# Patient Record
Sex: FEMALE | Race: WHITE | NOT HISPANIC OR LATINO
[De-identification: names, ages, dates, MRNs, and addresses within clinical notes are randomized per-mention and may not be internally consistent; named-entity substitution may affect disease eponyms.]

---

## 2017-01-24 ENCOUNTER — APPOINTMENT (OUTPATIENT)
Dept: GASTROENTEROLOGY | Facility: CLINIC | Age: 82
End: 2017-01-24

## 2017-02-13 LAB
INR PPP: 2.31
INR PPP: 2.4

## 2017-02-27 LAB — INR PPP: 2.12

## 2017-03-14 LAB — INR PPP: 2.64

## 2017-04-03 LAB — INR PPP: 2.52

## 2017-04-10 LAB — INR PPP: 2.92

## 2017-04-25 ENCOUNTER — APPOINTMENT (OUTPATIENT)
Dept: CARDIOLOGY | Facility: CLINIC | Age: 82
End: 2017-04-25

## 2017-04-25 ENCOUNTER — NON-APPOINTMENT (OUTPATIENT)
Age: 82
End: 2017-04-25

## 2017-04-25 VITALS
WEIGHT: 130 LBS | HEIGHT: 64 IN | DIASTOLIC BLOOD PRESSURE: 73 MMHG | BODY MASS INDEX: 22.2 KG/M2 | OXYGEN SATURATION: 95 % | HEART RATE: 81 BPM | SYSTOLIC BLOOD PRESSURE: 154 MMHG

## 2017-04-25 DIAGNOSIS — J45.909 UNSPECIFIED ASTHMA, UNCOMPLICATED: ICD-10-CM

## 2017-05-08 LAB — INR PPP: 1.93

## 2017-05-26 ENCOUNTER — APPOINTMENT (OUTPATIENT)
Dept: GASTROENTEROLOGY | Facility: CLINIC | Age: 82
End: 2017-05-26

## 2017-05-26 VITALS
WEIGHT: 139 LBS | OXYGEN SATURATION: 96 % | SYSTOLIC BLOOD PRESSURE: 140 MMHG | BODY MASS INDEX: 23.86 KG/M2 | DIASTOLIC BLOOD PRESSURE: 80 MMHG | RESPIRATION RATE: 14 BRPM | HEART RATE: 68 BPM

## 2017-05-26 DIAGNOSIS — Z87.19 PERSONAL HISTORY OF OTHER DISEASES OF THE DIGESTIVE SYSTEM: ICD-10-CM

## 2017-05-26 DIAGNOSIS — K92.1 MELENA: ICD-10-CM

## 2017-05-26 DIAGNOSIS — Z86.010 PERSONAL HISTORY OF COLONIC POLYPS: ICD-10-CM

## 2017-05-26 DIAGNOSIS — K57.30 DIVERTICULOSIS OF LARGE INTESTINE W/OUT PERFORATION OR ABSCESS W/OUT BLEEDING: ICD-10-CM

## 2017-05-26 LAB — INR PPP: 2.34

## 2017-06-27 LAB
INR PPP: 2.02
INR PPP: 2.04

## 2017-07-25 ENCOUNTER — APPOINTMENT (OUTPATIENT)
Dept: CARDIOLOGY | Facility: CLINIC | Age: 82
End: 2017-07-25

## 2017-07-25 ENCOUNTER — APPOINTMENT (OUTPATIENT)
Dept: CV DIAGNOSITCS | Facility: HOSPITAL | Age: 82
End: 2017-07-25

## 2017-08-15 ENCOUNTER — APPOINTMENT (OUTPATIENT)
Dept: CARDIOLOGY | Facility: CLINIC | Age: 82
End: 2017-08-15

## 2017-08-21 ENCOUNTER — APPOINTMENT (OUTPATIENT)
Dept: CV DIAGNOSITCS | Facility: HOSPITAL | Age: 82
End: 2017-08-21

## 2017-08-21 ENCOUNTER — NON-APPOINTMENT (OUTPATIENT)
Age: 82
End: 2017-08-21

## 2017-08-21 ENCOUNTER — APPOINTMENT (OUTPATIENT)
Dept: CARDIOLOGY | Facility: CLINIC | Age: 82
End: 2017-08-21
Payer: MEDICARE

## 2017-08-21 ENCOUNTER — OUTPATIENT (OUTPATIENT)
Dept: OUTPATIENT SERVICES | Facility: HOSPITAL | Age: 82
LOS: 1 days | End: 2017-08-21
Payer: MEDICARE

## 2017-08-21 VITALS
SYSTOLIC BLOOD PRESSURE: 167 MMHG | BODY MASS INDEX: 23.56 KG/M2 | HEART RATE: 96 BPM | DIASTOLIC BLOOD PRESSURE: 65 MMHG | WEIGHT: 138 LBS | RESPIRATION RATE: 14 BRPM | HEIGHT: 64 IN | OXYGEN SATURATION: 97 %

## 2017-08-21 DIAGNOSIS — I48.91 UNSPECIFIED ATRIAL FIBRILLATION: ICD-10-CM

## 2017-08-21 DIAGNOSIS — I34.0 NONRHEUMATIC MITRAL (VALVE) INSUFFICIENCY: ICD-10-CM

## 2017-08-21 PROCEDURE — 93000 ELECTROCARDIOGRAM COMPLETE: CPT

## 2017-08-21 PROCEDURE — 93306 TTE W/DOPPLER COMPLETE: CPT | Mod: 26

## 2017-08-21 PROCEDURE — 99215 OFFICE O/P EST HI 40 MIN: CPT

## 2017-08-22 LAB
ALBUMIN SERPL ELPH-MCNC: 4.1 G/DL
ALP BLD-CCNC: 65 U/L
ALT SERPL-CCNC: 36 U/L
ANION GAP SERPL CALC-SCNC: 16 MMOL/L
AST SERPL-CCNC: 33 U/L
BASOPHILS # BLD AUTO: 0.01 K/UL
BASOPHILS NFR BLD AUTO: 0.1 %
BILIRUB SERPL-MCNC: 1.8 MG/DL
BUN SERPL-MCNC: 19 MG/DL
CALCIUM SERPL-MCNC: 10.4 MG/DL
CHLORIDE SERPL-SCNC: 98 MMOL/L
CHOLEST SERPL-MCNC: 171 MG/DL
CHOLEST/HDLC SERPL: 2 RATIO
CO2 SERPL-SCNC: 25 MMOL/L
CREAT SERPL-MCNC: 0.71 MG/DL
EOSINOPHIL # BLD AUTO: 0.11 K/UL
EOSINOPHIL NFR BLD AUTO: 1.5 %
GLUCOSE SERPL-MCNC: 71 MG/DL
HCT VFR BLD CALC: 37.2 %
HDLC SERPL-MCNC: 84 MG/DL
HGB BLD-MCNC: 12.2 G/DL
IMM GRANULOCYTES NFR BLD AUTO: 0.3 %
LDLC SERPL CALC-MCNC: 70 MG/DL
LYMPHOCYTES # BLD AUTO: 1.71 K/UL
LYMPHOCYTES NFR BLD AUTO: 24 %
MAN DIFF?: NORMAL
MCHC RBC-ENTMCNC: 32 PG
MCHC RBC-ENTMCNC: 32.8 GM/DL
MCV RBC AUTO: 97.6 FL
MONOCYTES # BLD AUTO: 0.66 K/UL
MONOCYTES NFR BLD AUTO: 9.3 %
NEUTROPHILS # BLD AUTO: 4.62 K/UL
NEUTROPHILS NFR BLD AUTO: 64.8 %
PLATELET # BLD AUTO: 191 K/UL
POTASSIUM SERPL-SCNC: 4.4 MMOL/L
PROT SERPL-MCNC: 7.5 G/DL
RBC # BLD: 3.81 M/UL
RBC # FLD: 17.5 %
SODIUM SERPL-SCNC: 139 MMOL/L
TRIGL SERPL-MCNC: 87 MG/DL
TSH SERPL-ACNC: 1.32 UIU/ML
WBC # FLD AUTO: 7.13 K/UL

## 2017-08-22 RX ORDER — METOPROLOL SUCCINATE 50 MG/1
50 TABLET, EXTENDED RELEASE ORAL DAILY
Qty: 90 | Refills: 3 | Status: DISCONTINUED | COMMUNITY
Start: 2017-08-22 | End: 2017-08-22

## 2017-10-31 LAB — INR PPP: 1.98

## 2017-12-19 ENCOUNTER — NON-APPOINTMENT (OUTPATIENT)
Age: 82
End: 2017-12-19

## 2017-12-19 ENCOUNTER — APPOINTMENT (OUTPATIENT)
Dept: CARDIOLOGY | Facility: CLINIC | Age: 82
End: 2017-12-19
Payer: MEDICARE

## 2017-12-19 VITALS
SYSTOLIC BLOOD PRESSURE: 155 MMHG | DIASTOLIC BLOOD PRESSURE: 75 MMHG | HEART RATE: 77 BPM | OXYGEN SATURATION: 95 % | BODY MASS INDEX: 24.41 KG/M2 | HEIGHT: 64 IN | RESPIRATION RATE: 14 BRPM | WEIGHT: 143 LBS

## 2017-12-19 VITALS
DIASTOLIC BLOOD PRESSURE: 62 MMHG | HEART RATE: 86 BPM | OXYGEN SATURATION: 95 % | SYSTOLIC BLOOD PRESSURE: 148 MMHG | BODY MASS INDEX: 24.55 KG/M2 | WEIGHT: 143 LBS

## 2017-12-19 PROCEDURE — 93000 ELECTROCARDIOGRAM COMPLETE: CPT

## 2017-12-19 PROCEDURE — 99215 OFFICE O/P EST HI 40 MIN: CPT

## 2017-12-19 RX ORDER — FLUTICASONE FUROATE AND VILANTEROL TRIFENATATE 100; 25 UG/1; UG/1
100-25 POWDER RESPIRATORY (INHALATION)
Refills: 0 | Status: DISCONTINUED | COMMUNITY
Start: 2017-04-25 | End: 2017-12-19

## 2018-04-17 ENCOUNTER — APPOINTMENT (OUTPATIENT)
Dept: CARDIOLOGY | Facility: CLINIC | Age: 83
End: 2018-04-17
Payer: MEDICARE

## 2018-04-17 ENCOUNTER — NON-APPOINTMENT (OUTPATIENT)
Age: 83
End: 2018-04-17

## 2018-04-17 VITALS
OXYGEN SATURATION: 95 % | WEIGHT: 135 LBS | SYSTOLIC BLOOD PRESSURE: 153 MMHG | RESPIRATION RATE: 14 BRPM | HEIGHT: 64 IN | HEART RATE: 74 BPM | DIASTOLIC BLOOD PRESSURE: 80 MMHG | BODY MASS INDEX: 23.05 KG/M2

## 2018-04-17 VITALS — SYSTOLIC BLOOD PRESSURE: 132 MMHG | DIASTOLIC BLOOD PRESSURE: 65 MMHG

## 2018-04-17 PROCEDURE — 99215 OFFICE O/P EST HI 40 MIN: CPT

## 2018-04-17 PROCEDURE — 93000 ELECTROCARDIOGRAM COMPLETE: CPT

## 2018-07-28 PROBLEM — K57.30 DIVERTICULOSIS OF COLON: Status: RESOLVED | Noted: 2017-05-26 | Resolved: 2018-07-28

## 2018-07-31 ENCOUNTER — APPOINTMENT (OUTPATIENT)
Dept: CARDIOLOGY | Facility: CLINIC | Age: 83
End: 2018-07-31
Payer: MEDICARE

## 2018-07-31 VITALS
DIASTOLIC BLOOD PRESSURE: 70 MMHG | SYSTOLIC BLOOD PRESSURE: 169 MMHG | HEART RATE: 106 BPM | WEIGHT: 135 LBS | BODY MASS INDEX: 23.05 KG/M2 | RESPIRATION RATE: 14 BRPM | HEIGHT: 64 IN | OXYGEN SATURATION: 96 %

## 2018-07-31 VITALS — SYSTOLIC BLOOD PRESSURE: 135 MMHG | DIASTOLIC BLOOD PRESSURE: 60 MMHG

## 2018-07-31 PROCEDURE — 99215 OFFICE O/P EST HI 40 MIN: CPT

## 2018-07-31 PROCEDURE — 93000 ELECTROCARDIOGRAM COMPLETE: CPT

## 2018-12-11 ENCOUNTER — NON-APPOINTMENT (OUTPATIENT)
Age: 83
End: 2018-12-11

## 2018-12-11 ENCOUNTER — APPOINTMENT (OUTPATIENT)
Dept: CARDIOLOGY | Facility: CLINIC | Age: 83
End: 2018-12-11
Payer: MEDICARE

## 2018-12-11 VITALS
WEIGHT: 136 LBS | BODY MASS INDEX: 23.22 KG/M2 | SYSTOLIC BLOOD PRESSURE: 153 MMHG | HEIGHT: 64 IN | RESPIRATION RATE: 16 BRPM | TEMPERATURE: 98 F | HEART RATE: 80 BPM | DIASTOLIC BLOOD PRESSURE: 80 MMHG | OXYGEN SATURATION: 97 %

## 2018-12-11 DIAGNOSIS — L40.9 PSORIASIS, UNSPECIFIED: ICD-10-CM

## 2018-12-11 PROCEDURE — 99215 OFFICE O/P EST HI 40 MIN: CPT

## 2018-12-11 PROCEDURE — 93000 ELECTROCARDIOGRAM COMPLETE: CPT

## 2018-12-11 RX ORDER — VALSARTAN 40 MG/1
40 TABLET, COATED ORAL DAILY
Refills: 0 | Status: DISCONTINUED | COMMUNITY
Start: 2017-04-25 | End: 2018-12-11

## 2018-12-11 NOTE — REASON FOR VISIT
[FreeTextEntry1] : 88 yp w persistent AF , on coumadin , psoriatuc arthritis - treated w methotrexate and embrel.\par \par No bruising or bleeding with coumadin  INR checked regualrly - she had yeterday's labs sent to us.\par \par Some increasing leg sweeling . No CP or Dyspnea. Diuretic changed.\par \par No PND , orthopnea.

## 2018-12-11 NOTE — PHYSICAL EXAM
[FreeTextEntry1] : elderly woman, kyphotic. [Normal Conjunctiva] : the conjunctiva exhibited no abnormalities [Eyelids - No Xanthelasma] : the eyelids demonstrated no xanthelasmas [Normal Oral Mucosa] : normal oral mucosa [No Oral Pallor] : no oral pallor [No Oral Cyanosis] : no oral cyanosis [Normal Jugular Venous A Waves Present] : normal jugular venous A waves present [Normal Jugular Venous V Waves Present] : normal jugular venous V waves present [No Jugular Venous Hampton A Waves] : no jugular venous hampton A waves [Respiration, Rhythm And Depth] : normal respiratory rhythm and effort [Exaggerated Use Of Accessory Muscles For Inspiration] : no accessory muscle use [Auscultation Breath Sounds / Voice Sounds] : lungs were clear to auscultation bilaterally [Normal] : normal [No Precordial Heave] : no precordial heave was noted [Irregularly Irregular] : irregularly irregular [Normal S1] : normal S1 [Normal S2] : normal S2 [Click] : no click [II] : a grade 2 [Right Carotid Bruit] : no bruit heard over the right carotid [Left Carotid Bruit] : no bruit heard over the left carotid [1+] : left 1+ [No Abnormalities] : the abdominal aorta was not enlarged and no bruit was heard [___ +] : bilateral [unfilled]U+ pitting edema to the ankles [Abdomen Soft] : soft [Abdomen Tenderness] : non-tender [Abdomen Mass (___ Cm)] : no abdominal mass palpated [Abnormal Walk] : normal gait [Gait - Sufficient For Exercise Testing] : the gait was sufficient for exercise testing [Nail Clubbing] : no clubbing of the fingernails [Cyanosis, Localized] : no localized cyanosis [Petechial Hemorrhages (___cm)] : no petechial hemorrhages [Skin Color & Pigmentation] : normal skin color and pigmentation [] : no rash [No Venous Stasis] : no venous stasis [Skin Lesions] : no skin lesions [No Skin Ulcers] : no skin ulcer [No Xanthoma] : no  xanthoma was observed [Oriented To Time, Place, And Person] : oriented to person, place, and time [Affect] : the affect was normal [Mood] : the mood was normal [No Anxiety] : not feeling anxious

## 2018-12-11 NOTE — ASSESSMENT
[FreeTextEntry1] : 89 yo w \par persistnt AF - rate controlled , asymptomatic  Yesterday's INR = 1.98. NO change in doseing\par HTN - repeated BP manulaly   132/62. comtinue current meds.\par psoriatic arthrits - on embrel and methotrexate\par peripheral edema  - better on Lasix. Spoke about leg raising , sleeping with pillow under legs to help. Last echo 8/17 showed modertate pulmonary HTN . likely contributing along with arthritis to edema.

## 2019-05-08 ENCOUNTER — CHART COPY (OUTPATIENT)
Age: 84
End: 2019-05-08

## 2019-05-21 ENCOUNTER — OUTPATIENT (OUTPATIENT)
Dept: OUTPATIENT SERVICES | Facility: HOSPITAL | Age: 84
LOS: 1 days | End: 2019-05-21

## 2019-05-21 ENCOUNTER — APPOINTMENT (OUTPATIENT)
Dept: CV DIAGNOSITCS | Facility: HOSPITAL | Age: 84
End: 2019-05-21
Payer: MEDICARE

## 2019-05-21 ENCOUNTER — APPOINTMENT (OUTPATIENT)
Dept: CARDIOLOGY | Facility: CLINIC | Age: 84
End: 2019-05-21
Payer: MEDICARE

## 2019-05-21 ENCOUNTER — NON-APPOINTMENT (OUTPATIENT)
Age: 84
End: 2019-05-21

## 2019-05-21 VITALS
SYSTOLIC BLOOD PRESSURE: 168 MMHG | DIASTOLIC BLOOD PRESSURE: 75 MMHG | HEIGHT: 64 IN | BODY MASS INDEX: 23.39 KG/M2 | WEIGHT: 137 LBS | HEART RATE: 106 BPM | RESPIRATION RATE: 16 BRPM | OXYGEN SATURATION: 97 %

## 2019-05-21 DIAGNOSIS — R06.02 SHORTNESS OF BREATH: ICD-10-CM

## 2019-05-21 PROCEDURE — 93000 ELECTROCARDIOGRAM COMPLETE: CPT

## 2019-05-21 PROCEDURE — 99214 OFFICE O/P EST MOD 30 MIN: CPT

## 2019-05-21 PROCEDURE — 93306 TTE W/DOPPLER COMPLETE: CPT | Mod: 26

## 2019-05-21 RX ORDER — RAMIPRIL 2.5 MG/1
2.5 CAPSULE ORAL DAILY
Qty: 90 | Refills: 3 | Status: ACTIVE | COMMUNITY
Start: 2019-05-21

## 2019-05-21 RX ORDER — TELMISARTAN 20 MG/1
20 TABLET ORAL
Refills: 0 | Status: DISCONTINUED | COMMUNITY
Start: 2018-12-11 | End: 2019-05-21

## 2019-05-21 RX ORDER — FUROSEMIDE 40 MG/1
40 TABLET ORAL DAILY
Qty: 90 | Refills: 3 | Status: ACTIVE | COMMUNITY
Start: 2018-12-11

## 2019-05-21 NOTE — PHYSICAL EXAM
[Normal Conjunctiva] : the conjunctiva exhibited no abnormalities [Eyelids - No Xanthelasma] : the eyelids demonstrated no xanthelasmas [Normal Oral Mucosa] : normal oral mucosa [No Oral Pallor] : no oral pallor [No Oral Cyanosis] : no oral cyanosis [Normal Jugular Venous A Waves Present] : normal jugular venous A waves present [Normal Jugular Venous V Waves Present] : normal jugular venous V waves present [No Jugular Venous Hampton A Waves] : no jugular venous hampton A waves [Respiration, Rhythm And Depth] : normal respiratory rhythm and effort [Exaggerated Use Of Accessory Muscles For Inspiration] : no accessory muscle use [Auscultation Breath Sounds / Voice Sounds] : lungs were clear to auscultation bilaterally [Abdomen Soft] : soft [Abdomen Tenderness] : non-tender [Abdomen Mass (___ Cm)] : no abdominal mass palpated [Abnormal Walk] : normal gait [Gait - Sufficient For Exercise Testing] : the gait was sufficient for exercise testing [Nail Clubbing] : no clubbing of the fingernails [Cyanosis, Localized] : no localized cyanosis [Petechial Hemorrhages (___cm)] : no petechial hemorrhages [Skin Color & Pigmentation] : normal skin color and pigmentation [] : no rash [No Venous Stasis] : no venous stasis [Skin Lesions] : no skin lesions [No Skin Ulcers] : no skin ulcer [No Xanthoma] : no  xanthoma was observed [Oriented To Time, Place, And Person] : oriented to person, place, and time [Affect] : the affect was normal [Mood] : the mood was normal [No Anxiety] : not feeling anxious [Normal] : normal [No Precordial Heave] : no precordial heave was noted [Irregularly Irregular] : irregularly irregular [Normal S1] : normal S1 [Normal S2] : normal S2 [II] : a grade 2 [1+] : left 1+ [No Abnormalities] : the abdominal aorta was not enlarged and no bruit was heard [___ +] : bilateral [unfilled]U+ pitting edema to the ankles [FreeTextEntry1] : elderly woman, kyphotic. [Click] : no click [Right Carotid Bruit] : no bruit heard over the right carotid [Left Carotid Bruit] : no bruit heard over the left carotid

## 2019-05-21 NOTE — ASSESSMENT
[FreeTextEntry1] : 89 year old female with history of hypertension, persistent atrial fibrillation on Coumadin and psoriatic arthritis treated with Enbrel and Methotrexate.\par Awaiting a dental extraction.\par \par -Patient's cardiac condition is stable\par -Persistent afib on well controlled anticoagulation\par -Blood pressure today in office is elevated due to withholding of diuretic therapy today\par -Echo results remain stable and unchanged, LVEF 60%, moderate pulmonary hypertension, mild to moderate AR\par \par PLAN\par Advised patient to take for three days the Lasix as prescribed and then recheck blood pressure at home.\par *There are no current cardiac contraindications in proceeding with patient's dental extraction\par - She has been advised to hold Coumadin for three days prior to her dental procedure (last dose to be taken on Tuesday, May 28, 2019 and restart as per Dr. Stauffer. \par -Optimally would advised patient to restart Coumadin 24 hours post procedure on Saturday evening June 1, 2019.\par   If necessary, she can withhold her first Coumadin restart until Sunday, June 2nd. \par \par

## 2019-05-21 NOTE — REASON FOR VISIT
[FreeTextEntry1] : 88 yp w persistent AF , on Coumadin , psoriatic arthritis - treated w methotrexate and Enbrel.\par \par No bruising or bleeding with Coumadin  INR checked regularly - she had yesterdays' labs sent to us.\par \par Some increasing leg swelling . No CP or Dyspnea. Diuretic changed.\par \par No PND , orthopnea.\par \par Interval Note\par May 21, 2019\par \par 89 year old female with history of hypertension, persistent atrial fibrillation on Coumadin and psoriatic arthritis treated with Enbrel and Methotrexate.\par She ambulates with a walker secondary to chronic right leg tibial fracture.\par \par Patient presents today with chronic, but stable swelling in her lower extremities and elevated blood pressure. \par Blood pressure today is 168/75, however, patient has not taken her diuretic today for concern that she would have an episode of incontinence.\par \par Patient does report that even at home she does not take the full 40 mg of Lasix due to excessive urgency and incontinence. \par Currently taking only Lasix 20mg daily. \par \par Of note, patient is in need of a dental extraction and will require a medical clearance for oral surgeon\par Dr. Juan Francisco Stauffer in [place] on May 31, 2019.\par \par Additionally, patient underwent an echocardiogram today and will be reviewed prior to her dental procedure. \par

## 2019-08-01 ENCOUNTER — RX RENEWAL (OUTPATIENT)
Age: 84
End: 2019-08-01

## 2019-09-05 ENCOUNTER — LABORATORY RESULT (OUTPATIENT)
Age: 84
End: 2019-09-05

## 2019-09-05 ENCOUNTER — APPOINTMENT (OUTPATIENT)
Dept: PULMONOLOGY | Facility: CLINIC | Age: 84
End: 2019-09-05
Payer: MEDICARE

## 2019-09-05 VITALS — BODY MASS INDEX: 23.67 KG/M2 | WEIGHT: 127 LBS | HEART RATE: 102 BPM | HEIGHT: 61.5 IN | OXYGEN SATURATION: 93 %

## 2019-09-05 VITALS
HEART RATE: 116 BPM | SYSTOLIC BLOOD PRESSURE: 159 MMHG | DIASTOLIC BLOOD PRESSURE: 75 MMHG | WEIGHT: 127 LBS | HEIGHT: 61.5 IN | RESPIRATION RATE: 17 BRPM | TEMPERATURE: 98.4 F | OXYGEN SATURATION: 94 % | BODY MASS INDEX: 23.67 KG/M2

## 2019-09-05 DIAGNOSIS — R06.02 SHORTNESS OF BREATH: ICD-10-CM

## 2019-09-05 DIAGNOSIS — R60.9 EDEMA, UNSPECIFIED: ICD-10-CM

## 2019-09-05 PROCEDURE — 94726 PLETHYSMOGRAPHY LUNG VOLUMES: CPT

## 2019-09-05 PROCEDURE — 99205 OFFICE O/P NEW HI 60 MIN: CPT | Mod: 25

## 2019-09-05 PROCEDURE — ZZZZZ: CPT

## 2019-09-05 PROCEDURE — 94729 DIFFUSING CAPACITY: CPT

## 2019-09-05 PROCEDURE — 94010 BREATHING CAPACITY TEST: CPT

## 2019-09-05 PROCEDURE — 36415 COLL VENOUS BLD VENIPUNCTURE: CPT

## 2019-09-05 NOTE — DISCUSSION/SUMMARY
[FreeTextEntry1] : ---Assessment plan----------The patient has been referred here for further opinion regarding pulmonary problem, 88 yo w/pulm htn on echo, PMH afib on coumadin, psoriasis  and femur fracture\par \par 1) pulm htn lab workup done today -LABS ---PT DOES HAVE ELEVATED PA PRESSURES  BUT ETEIOLOGY OF PULM HTN APPEARS MIXED WITH ELEMENT OF DIASTOLIC DYSFUNCTION , MOD MR , MOD AR, H/O A FIB , LA ENALRGEMENT ON ECHO    ---[  WHO GROUP I AND GROUP II   PULM HTN   ]  ---ALSO CONSIDERING HER  AGE  AND OTHER COMORBIDITIES I DO NOT THINK SHE IS A CANDIDATE FOR  ANY  PULMONARY VASODILATOR TREATMENT \par 2) needs pft\par 3-  NEEDS CTA \par 4 -DEXA SCAN  -R/O OSTEOPOROSIS\par \par \par Thanks for allowing  me to participate  in the care of this patient.  Patient at this time  will follow  the above mentioned recommendations and return back for follow up visit. If you have any questions  I can be reached  at #[phone] (beeper)  or  [phone] (office).\par \par Sary Renteria MD, FCCP \par Director, Pulmonary Hypertension Program \par [facility] \par Division of Pulmonary, Critical Care and Sleep Medicine \par  Professor of Medicine \par [employer]\par

## 2019-09-05 NOTE — PHYSICAL EXAM
[General Appearance - Well Developed] : well developed [Normal Appearance] : normal appearance [Well Groomed] : well groomed [General Appearance - Well Nourished] : well nourished [No Deformities] : no deformities [General Appearance - In No Acute Distress] : no acute distress [Normal Conjunctiva] : the conjunctiva exhibited no abnormalities [Eyelids - No Xanthelasma] : the eyelids demonstrated no xanthelasmas [Normal Oropharynx] : normal oropharynx [Neck Appearance] : the appearance of the neck was normal [Jugular Venous Distention Increased] : there was no jugular-venous distention [Neck Cervical Mass (___cm)] : no neck mass was observed [Thyroid Diffuse Enlargement] : the thyroid was not enlarged [Thyroid Nodule] : there were no palpable thyroid nodules [Respiration, Rhythm And Depth] : normal respiratory rhythm and effort [Exaggerated Use Of Accessory Muscles For Inspiration] : no accessory muscle use [Auscultation Breath Sounds / Voice Sounds] : lungs were clear to auscultation bilaterally [Tenderness: ____] : tenderness [unfilled] [Abdomen Soft] : soft [Abdomen Tenderness] : non-tender [Abdomen Mass (___ Cm)] : no abdominal mass palpated [2+ Pitting] : 2+  pitting [Skin Color & Pigmentation] : normal skin color and pigmentation [] : no rash [Skin Turgor] : normal skin turgor [Deep Tendon Reflexes (DTR)] : deep tendon reflexes were 2+ and symmetric [Sensation] : the sensory exam was normal to light touch and pinprick [Oriented To Time, Place, And Person] : oriented to person, place, and time [No Focal Deficits] : no focal deficits [Impaired Insight] : insight and judgment were intact [Affect] : the affect was normal [FreeTextEntry1] : walker

## 2019-09-05 NOTE — REVIEW OF SYSTEMS
[Dyspnea] : dyspnea [Fracture] : fracture [As Noted in HPI] : as noted in HPI [Negative] : Sleep Disorder

## 2019-09-05 NOTE — HISTORY OF PRESENT ILLNESS
[FreeTextEntry1] : This letter  is regarding your patient  who  attended pulmonary out patient office today.  I have reviewed  patient's  past history, social history, family history and medication list. I also  reviewed nurse practitioners/ and fellows  notes and assessment and agree with it.  \par The patient was referred by  for pulm htn. Pt w/PMH of AFIB on coumadin. Ambulates via walker given femur fracture. H/o psoriasis - lesliews Dr Esparza at [facility]- on MTX and Enbrel\par \par ------No history of , fever, chills , rigors, chest pain, or hemoptysis. Questionable history of Raynaud's phenomenon. No h/o significant weight loss in last few months. No history of liver dysfunction , collagen vascular disorder or chronic thromboembolic disease. I would classify the patient's dyspnea as WHO  FUNCTIONAL CLASS II--------\par \par ----Echo  date--5/2019 est pasp= 61 mmHg----\par ----Pft date-----pending----\par ----Ct scan date- last week- CD/report unavailable------PT TO BRING CD FOR REVIEW\par ----Cath date------not done------\par \par

## 2019-09-06 LAB
25(OH)D3 SERPL-MCNC: 26.5 NG/ML
ALBUMIN SERPL ELPH-MCNC: 4 G/DL
ALP BLD-CCNC: 113 U/L
ALT SERPL-CCNC: 46 U/L
ANACR T: NEGATIVE
ANION GAP SERPL CALC-SCNC: 13 MMOL/L
APTT BLD: 55 SEC
AST SERPL-CCNC: 45 U/L
BASOPHILS # BLD AUTO: 0.03 K/UL
BASOPHILS NFR BLD AUTO: 0.4 %
BILIRUB SERPL-MCNC: 1.2 MG/DL
BUN SERPL-MCNC: 18 MG/DL
CALCIUM SERPL-MCNC: 9.8 MG/DL
CALCIUM SERPL-MCNC: 9.8 MG/DL
CHLORIDE SERPL-SCNC: 97 MMOL/L
CO2 SERPL-SCNC: 23 MMOL/L
CREAT SERPL-MCNC: 0.53 MG/DL
CRP SERPL-MCNC: 1.93 MG/DL
EOSINOPHIL # BLD AUTO: 0.1 K/UL
EOSINOPHIL NFR BLD AUTO: 1.2 %
ERYTHROCYTE [SEDIMENTATION RATE] IN BLOOD BY WESTERGREN METHOD: 52 MM/HR
FOLATE RBC-MCNC: 2159 NG/ML
GLUCOSE SERPL-MCNC: 106 MG/DL
HCT VFR BLD CALC: 39 %
HCT VFR BLD CALC: 39 %
HCYS SERPL-MCNC: 11.1 UMOL/L
HGB BLD-MCNC: 12.5 G/DL
IMM GRANULOCYTES NFR BLD AUTO: 0.6 %
INR PPP: 1.38 RATIO
LYMPHOCYTES # BLD AUTO: 1.19 K/UL
LYMPHOCYTES NFR BLD AUTO: 14.4 %
MAN DIFF?: NORMAL
MCHC RBC-ENTMCNC: 30.5 PG
MCHC RBC-ENTMCNC: 32.1 GM/DL
MCV RBC AUTO: 95.1 FL
MONOCYTES # BLD AUTO: 1.12 K/UL
MONOCYTES NFR BLD AUTO: 13.6 %
MPO AB + PR3 PNL SER: NORMAL
NEUTROPHILS # BLD AUTO: 5.76 K/UL
NEUTROPHILS NFR BLD AUTO: 69.8 %
NT-PROBNP SERPL-MCNC: 1139 PG/ML
PARATHYROID HORMONE INTACT: 50 PG/ML
PHOSPHATE SERPL-MCNC: 2.6 MG/DL
PLATELET # BLD AUTO: 316 K/UL
POTASSIUM SERPL-SCNC: 4.9 MMOL/L
PROT SERPL-MCNC: 7.2 G/DL
PT BLD: 15.7 SEC
RBC # BLD: 4.1 M/UL
RBC # FLD: 17.9 %
RHEUMATOID FACT SER QL: <10 IU/ML
SODIUM SERPL-SCNC: 133 MMOL/L
TSH SERPL-ACNC: 1.17 UIU/ML
URATE SERPL-MCNC: 4 MG/DL
VIT B12 SERPL-MCNC: 1890 PG/ML
WBC # FLD AUTO: 8.25 K/UL

## 2019-09-09 LAB
CARDIOLIPIN AB SER IA-ACNC: POSITIVE
CCP AB SER IA-ACNC: <8 UNITS
ENA SCL70 IGG SER IA-ACNC: <0.2 AL
ENA SS-A AB SER IA-ACNC: >8 AL
ENA SS-B AB SER IA-ACNC: <0.2 AL
RF+CCP IGG SER-IMP: NEGATIVE

## 2019-10-14 ENCOUNTER — APPOINTMENT (OUTPATIENT)
Dept: PULMONOLOGY | Facility: CLINIC | Age: 84
End: 2019-10-14

## 2019-10-29 ENCOUNTER — APPOINTMENT (OUTPATIENT)
Dept: PULMONOLOGY | Facility: CLINIC | Age: 84
End: 2019-10-29

## 2019-11-26 ENCOUNTER — APPOINTMENT (OUTPATIENT)
Dept: CARDIOLOGY | Facility: CLINIC | Age: 84
End: 2019-11-26
Payer: MEDICARE

## 2019-11-26 ENCOUNTER — NON-APPOINTMENT (OUTPATIENT)
Age: 84
End: 2019-11-26

## 2019-11-26 VITALS
HEIGHT: 61.5 IN | WEIGHT: 127 LBS | DIASTOLIC BLOOD PRESSURE: 82 MMHG | OXYGEN SATURATION: 96 % | SYSTOLIC BLOOD PRESSURE: 161 MMHG | BODY MASS INDEX: 23.67 KG/M2 | RESPIRATION RATE: 16 BRPM | HEART RATE: 102 BPM

## 2019-11-26 DIAGNOSIS — R60.0 LOCALIZED EDEMA: ICD-10-CM

## 2019-11-26 DIAGNOSIS — I34.0 NONRHEUMATIC MITRAL (VALVE) INSUFFICIENCY: ICD-10-CM

## 2019-11-26 PROCEDURE — 93000 ELECTROCARDIOGRAM COMPLETE: CPT

## 2019-11-26 PROCEDURE — 99214 OFFICE O/P EST MOD 30 MIN: CPT

## 2019-11-26 NOTE — PHYSICAL EXAM
[Normal Conjunctiva] : the conjunctiva exhibited no abnormalities [Eyelids - No Xanthelasma] : the eyelids demonstrated no xanthelasmas [Normal Oral Mucosa] : normal oral mucosa [Normal Jugular Venous A Waves Present] : normal jugular venous A waves present [No Oral Pallor] : no oral pallor [No Oral Cyanosis] : no oral cyanosis [Normal Jugular Venous V Waves Present] : normal jugular venous V waves present [No Jugular Venous Hampton A Waves] : no jugular venous hampton A waves [Respiration, Rhythm And Depth] : normal respiratory rhythm and effort [Exaggerated Use Of Accessory Muscles For Inspiration] : no accessory muscle use [Auscultation Breath Sounds / Voice Sounds] : lungs were clear to auscultation bilaterally [Abdomen Soft] : soft [Abdomen Tenderness] : non-tender [Abnormal Walk] : normal gait [Abdomen Mass (___ Cm)] : no abdominal mass palpated [Nail Clubbing] : no clubbing of the fingernails [Gait - Sufficient For Exercise Testing] : the gait was sufficient for exercise testing [Cyanosis, Localized] : no localized cyanosis [Petechial Hemorrhages (___cm)] : no petechial hemorrhages [Skin Color & Pigmentation] : normal skin color and pigmentation [] : no rash [Skin Lesions] : no skin lesions [No Venous Stasis] : no venous stasis [No Skin Ulcers] : no skin ulcer [No Xanthoma] : no  xanthoma was observed [Oriented To Time, Place, And Person] : oriented to person, place, and time [Mood] : the mood was normal [Affect] : the affect was normal [No Precordial Heave] : no precordial heave was noted [No Anxiety] : not feeling anxious [Normal] : normal [Normal S1] : normal S1 [Irregularly Irregular] : irregularly irregular [Normal S2] : normal S2 [II] : a grade 2 [1+] : left 1+ [No Abnormalities] : the abdominal aorta was not enlarged and no bruit was heard [___ +] : bilateral [unfilled]U+ pitting edema to the ankles [FreeTextEntry1] : elderly woman, kyphotic. [Left Carotid Bruit] : no bruit heard over the left carotid [Click] : no click [Right Carotid Bruit] : no bruit heard over the right carotid

## 2019-11-26 NOTE — ASSESSMENT
[FreeTextEntry1] : 89 year old female with history of hypertension, persistent atrial fibrillation on Coumadin and psoriatic arthritis treated with Enbrel and Methotrexate.\par Awaiting a dental extraction.\par \par -Patient's cardiac condition is stable\par -Persistent afib on well controlled anticoagulation\par -Blood pressure today in office is elevated due to withholding of diuretic therapy today\par -Echo results remain stable and unchanged, LVEF 60%, moderate pulmonary hypertension, mild to moderate AR\par \par PLAN\par Advised patient to take for three days the Lasix as prescribed and then recheck blood pressure at home.\par If necessary, will add an additional 20 mg in the late afternoon to get the BP under improved control.\par Will consider adding an additional agent, if diuresis does not completely get the blood pressure under full control.\par \par Full blood panel today .\par \par \par Follow up in 3 months. \par -

## 2019-11-26 NOTE — REASON FOR VISIT
[FreeTextEntry1] : 88 yp w persistent AF , on Coumadin , psoriatic arthritis - treated w methotrexate and Enbrel.\par \par No bruising or bleeding with Coumadin  INR checked regularly - she had yesterdays' labs sent to us.\par \par Some increasing leg swelling . No CP or Dyspnea. Diuretic changed.\par \par No PND , orthopnea.\par \par Interval Note\par May 21, 2019\par \par 89 year old female with history of hypertension, persistent atrial fibrillation on Coumadin and psoriatic arthritis treated with Enbrel and Methotrexate.\par She ambulates with a walker secondary to chronic right leg tibial fracture.\par \par Patient presents today with chronic, but stable swelling in her lower extremities and elevated blood pressure. \par Blood pressure today is 168/75, however, patient has not taken her diuretic today for concern that she would have an episode of incontinence.\par \par Patient does report that even at home she does not take the full 40 mg of Lasix due to excessive urgency and incontinence. \par Currently taking only Lasix 20mg daily. \par \par Of note, patient is in need of a dental extraction and will require a medical clearance for oral surgeon\par Dr. Juan Francisco Stauffer in [place] on May 31, 2019.\par \par Additionally, patient underwent an echocardiogram today and will be reviewed prior to her dental procedure. \par \par Interval Note\par November 26, 2019\par Patient returns for a routine cardiac follow up. Blood pressure today is elevated at : 161/82. \par Repeated BP and readings were unchanged.\par \par She admits that she does not take her diuretics before coming to the office due to urinary insurgency-\par which is likely the cause of her elevated pressure readings.\par \par Patient's lower extremity swelling have improved, in particular her left leg which always demonstrates +4 edema.\par Improvement patient feels is due to her increased activity with physical therapy.  \par \par EKG- stable and unchanged: afib @ 101 bpm.\par \par \par

## 2019-11-30 LAB
BASOPHILS # BLD AUTO: 0.02 K/UL
BASOPHILS NFR BLD AUTO: 0.3 %
CHOLEST SERPL-MCNC: 137 MG/DL
CHOLEST/HDLC SERPL: 1.7 RATIO
EOSINOPHIL # BLD AUTO: 0.06 K/UL
EOSINOPHIL NFR BLD AUTO: 1 %
ESTIMATED AVERAGE GLUCOSE: 94 MG/DL
HBA1C MFR BLD HPLC: 4.9 %
HCT VFR BLD CALC: 38.5 %
HDLC SERPL-MCNC: 79 MG/DL
HGB BLD-MCNC: 12.1 G/DL
IMM GRANULOCYTES NFR BLD AUTO: 0.3 %
INR PPP: 1.8 RATIO
LDLC SERPL CALC-MCNC: 46 MG/DL
LYMPHOCYTES # BLD AUTO: 1.24 K/UL
LYMPHOCYTES NFR BLD AUTO: 19.8 %
MAN DIFF?: NORMAL
MCHC RBC-ENTMCNC: 31.4 GM/DL
MCHC RBC-ENTMCNC: 31.7 PG
MCV RBC AUTO: 100.8 FL
MONOCYTES # BLD AUTO: 0.54 K/UL
MONOCYTES NFR BLD AUTO: 8.6 %
NEUTROPHILS # BLD AUTO: 4.37 K/UL
NEUTROPHILS NFR BLD AUTO: 70 %
PLATELET # BLD AUTO: 172 K/UL
PT BLD: 20.7 SEC
RBC # BLD: 3.82 M/UL
RBC # FLD: 17.3 %
TRIGL SERPL-MCNC: 62 MG/DL
TSH SERPL-ACNC: 1.63 UIU/ML
WBC # FLD AUTO: 6.25 K/UL

## 2020-02-12 LAB — INR PPP: 1.96

## 2020-06-09 ENCOUNTER — APPOINTMENT (OUTPATIENT)
Dept: CARDIOLOGY | Facility: CLINIC | Age: 85
End: 2020-06-09

## 2020-07-20 ENCOUNTER — RX RENEWAL (OUTPATIENT)
Age: 85
End: 2020-07-20

## 2021-01-11 RX ORDER — METOPROLOL SUCCINATE 25 MG/1
25 TABLET, EXTENDED RELEASE ORAL
Qty: 90 | Refills: 3 | Status: ACTIVE | COMMUNITY
Start: 2017-08-22 | End: 1900-01-01

## 2021-02-04 ENCOUNTER — NON-APPOINTMENT (OUTPATIENT)
Age: 86
End: 2021-02-04

## 2021-03-17 ENCOUNTER — NON-APPOINTMENT (OUTPATIENT)
Age: 86
End: 2021-03-17

## 2021-04-28 LAB — INR PPP: 1.5

## 2021-05-05 ENCOUNTER — NON-APPOINTMENT (OUTPATIENT)
Age: 86
End: 2021-05-05

## 2021-05-26 ENCOUNTER — NON-APPOINTMENT (OUTPATIENT)
Age: 86
End: 2021-05-26

## 2021-07-08 ENCOUNTER — NON-APPOINTMENT (OUTPATIENT)
Age: 86
End: 2021-07-08

## 2021-07-28 ENCOUNTER — NON-APPOINTMENT (OUTPATIENT)
Age: 86
End: 2021-07-28

## 2021-08-20 ENCOUNTER — NON-APPOINTMENT (OUTPATIENT)
Age: 86
End: 2021-08-20

## 2021-09-09 ENCOUNTER — NON-APPOINTMENT (OUTPATIENT)
Age: 86
End: 2021-09-09

## 2021-09-09 LAB — INR PPP: 2.34

## 2021-10-13 ENCOUNTER — RX RENEWAL (OUTPATIENT)
Age: 86
End: 2021-10-13

## 2021-10-21 ENCOUNTER — INPATIENT (INPATIENT)
Facility: HOSPITAL | Age: 86
LOS: 6 days | Discharge: NURSING, REHAB OR LONG-TERM CARE | End: 2021-10-28
Attending: HOSPITALIST | Admitting: HOSPITALIST
Payer: MEDICARE

## 2021-10-21 VITALS
SYSTOLIC BLOOD PRESSURE: 174 MMHG | OXYGEN SATURATION: 95 % | RESPIRATION RATE: 24 BRPM | HEART RATE: 75 BPM | TEMPERATURE: 97 F | DIASTOLIC BLOOD PRESSURE: 69 MMHG

## 2021-10-21 DIAGNOSIS — J18.9 PNEUMONIA, UNSPECIFIED ORGANISM: ICD-10-CM

## 2021-10-21 DIAGNOSIS — I10 ESSENTIAL (PRIMARY) HYPERTENSION: ICD-10-CM

## 2021-10-21 DIAGNOSIS — I48.20 CHRONIC ATRIAL FIBRILLATION, UNSPECIFIED: ICD-10-CM

## 2021-10-21 DIAGNOSIS — I50.9 HEART FAILURE, UNSPECIFIED: ICD-10-CM

## 2021-10-21 DIAGNOSIS — S72.001A FRACTURE OF UNSPECIFIED PART OF NECK OF RIGHT FEMUR, INITIAL ENCOUNTER FOR CLOSED FRACTURE: Chronic | ICD-10-CM

## 2021-10-21 DIAGNOSIS — R06.02 SHORTNESS OF BREATH: ICD-10-CM

## 2021-10-21 DIAGNOSIS — Z29.9 ENCOUNTER FOR PROPHYLACTIC MEASURES, UNSPECIFIED: ICD-10-CM

## 2021-10-21 LAB
ALBUMIN SERPL ELPH-MCNC: 4.3 G/DL — SIGNIFICANT CHANGE UP (ref 3.3–5)
ALP SERPL-CCNC: 83 U/L — SIGNIFICANT CHANGE UP (ref 40–120)
ALT FLD-CCNC: 42 U/L — HIGH (ref 4–33)
ANION GAP SERPL CALC-SCNC: 12 MMOL/L — SIGNIFICANT CHANGE UP (ref 7–14)
AST SERPL-CCNC: 41 U/L — HIGH (ref 4–32)
B PERT DNA SPEC QL NAA+PROBE: SIGNIFICANT CHANGE UP
B PERT+PARAPERT DNA PNL SPEC NAA+PROBE: SIGNIFICANT CHANGE UP
BASOPHILS # BLD AUTO: 0.02 K/UL — SIGNIFICANT CHANGE UP (ref 0–0.2)
BASOPHILS NFR BLD AUTO: 0.2 % — SIGNIFICANT CHANGE UP (ref 0–2)
BILIRUB SERPL-MCNC: 2.6 MG/DL — HIGH (ref 0.2–1.2)
BORDETELLA PARAPERTUSSIS (RAPRVP): SIGNIFICANT CHANGE UP
BUN SERPL-MCNC: 17 MG/DL — SIGNIFICANT CHANGE UP (ref 7–23)
C PNEUM DNA SPEC QL NAA+PROBE: SIGNIFICANT CHANGE UP
CALCIUM SERPL-MCNC: 9.4 MG/DL — SIGNIFICANT CHANGE UP (ref 8.4–10.5)
CHLORIDE SERPL-SCNC: 97 MMOL/L — LOW (ref 98–107)
CO2 SERPL-SCNC: 26 MMOL/L — SIGNIFICANT CHANGE UP (ref 22–31)
CREAT SERPL-MCNC: 0.43 MG/DL — LOW (ref 0.5–1.3)
EOSINOPHIL # BLD AUTO: 0.01 K/UL — SIGNIFICANT CHANGE UP (ref 0–0.5)
EOSINOPHIL NFR BLD AUTO: 0.1 % — SIGNIFICANT CHANGE UP (ref 0–6)
FLUAV SUBTYP SPEC NAA+PROBE: SIGNIFICANT CHANGE UP
FLUBV RNA SPEC QL NAA+PROBE: SIGNIFICANT CHANGE UP
GLUCOSE SERPL-MCNC: 118 MG/DL — HIGH (ref 70–99)
HADV DNA SPEC QL NAA+PROBE: SIGNIFICANT CHANGE UP
HCOV 229E RNA SPEC QL NAA+PROBE: SIGNIFICANT CHANGE UP
HCOV HKU1 RNA SPEC QL NAA+PROBE: SIGNIFICANT CHANGE UP
HCOV NL63 RNA SPEC QL NAA+PROBE: SIGNIFICANT CHANGE UP
HCOV OC43 RNA SPEC QL NAA+PROBE: SIGNIFICANT CHANGE UP
HCT VFR BLD CALC: 38 % — SIGNIFICANT CHANGE UP (ref 34.5–45)
HGB BLD-MCNC: 12.2 G/DL — SIGNIFICANT CHANGE UP (ref 11.5–15.5)
HMPV RNA SPEC QL NAA+PROBE: SIGNIFICANT CHANGE UP
HPIV1 RNA SPEC QL NAA+PROBE: SIGNIFICANT CHANGE UP
HPIV2 RNA SPEC QL NAA+PROBE: SIGNIFICANT CHANGE UP
HPIV3 RNA SPEC QL NAA+PROBE: SIGNIFICANT CHANGE UP
HPIV4 RNA SPEC QL NAA+PROBE: SIGNIFICANT CHANGE UP
IANC: 8.54 K/UL — HIGH (ref 1.5–8.5)
IMM GRANULOCYTES NFR BLD AUTO: 0.7 % — SIGNIFICANT CHANGE UP (ref 0–1.5)
INR BLD: 2.45 RATIO — HIGH (ref 0.88–1.16)
LIDOCAIN IGE QN: 28 U/L — SIGNIFICANT CHANGE UP (ref 7–60)
LYMPHOCYTES # BLD AUTO: 0.86 K/UL — LOW (ref 1–3.3)
LYMPHOCYTES # BLD AUTO: 8.2 % — LOW (ref 13–44)
M PNEUMO DNA SPEC QL NAA+PROBE: SIGNIFICANT CHANGE UP
MCHC RBC-ENTMCNC: 32.1 GM/DL — SIGNIFICANT CHANGE UP (ref 32–36)
MCHC RBC-ENTMCNC: 32.9 PG — SIGNIFICANT CHANGE UP (ref 27–34)
MCV RBC AUTO: 102.4 FL — HIGH (ref 80–100)
MONOCYTES # BLD AUTO: 0.96 K/UL — HIGH (ref 0–0.9)
MONOCYTES NFR BLD AUTO: 9.2 % — SIGNIFICANT CHANGE UP (ref 2–14)
NEUTROPHILS # BLD AUTO: 8.54 K/UL — HIGH (ref 1.8–7.4)
NEUTROPHILS NFR BLD AUTO: 81.6 % — HIGH (ref 43–77)
NRBC # BLD: 0 /100 WBCS — SIGNIFICANT CHANGE UP
NRBC # FLD: 0 K/UL — SIGNIFICANT CHANGE UP
NT-PROBNP SERPL-SCNC: 2056 PG/ML — HIGH
PLATELET # BLD AUTO: 172 K/UL — SIGNIFICANT CHANGE UP (ref 150–400)
POTASSIUM SERPL-MCNC: 4.3 MMOL/L — SIGNIFICANT CHANGE UP (ref 3.5–5.3)
POTASSIUM SERPL-SCNC: 4.3 MMOL/L — SIGNIFICANT CHANGE UP (ref 3.5–5.3)
PROT SERPL-MCNC: 7.2 G/DL — SIGNIFICANT CHANGE UP (ref 6–8.3)
PROTHROM AB SERPL-ACNC: 26.8 SEC — HIGH (ref 10.6–13.6)
RAPID RVP RESULT: SIGNIFICANT CHANGE UP
RBC # BLD: 3.71 M/UL — LOW (ref 3.8–5.2)
RBC # FLD: 17.6 % — HIGH (ref 10.3–14.5)
RSV RNA SPEC QL NAA+PROBE: SIGNIFICANT CHANGE UP
RV+EV RNA SPEC QL NAA+PROBE: SIGNIFICANT CHANGE UP
SARS-COV-2 RNA SPEC QL NAA+PROBE: SIGNIFICANT CHANGE UP
SODIUM SERPL-SCNC: 135 MMOL/L — SIGNIFICANT CHANGE UP (ref 135–145)
TROPONIN T, HIGH SENSITIVITY RESULT: 18 NG/L — SIGNIFICANT CHANGE UP
TROPONIN T, HIGH SENSITIVITY RESULT: 22 NG/L — SIGNIFICANT CHANGE UP
WBC # BLD: 10.46 K/UL — SIGNIFICANT CHANGE UP (ref 3.8–10.5)
WBC # FLD AUTO: 10.46 K/UL — SIGNIFICANT CHANGE UP (ref 3.8–10.5)

## 2021-10-21 PROCEDURE — 71045 X-RAY EXAM CHEST 1 VIEW: CPT | Mod: 26

## 2021-10-21 PROCEDURE — 99223 1ST HOSP IP/OBS HIGH 75: CPT

## 2021-10-21 PROCEDURE — 99285 EMERGENCY DEPT VISIT HI MDM: CPT | Mod: CS,25

## 2021-10-21 PROCEDURE — 93010 ELECTROCARDIOGRAM REPORT: CPT

## 2021-10-21 PROCEDURE — 71275 CT ANGIOGRAPHY CHEST: CPT | Mod: 26

## 2021-10-21 RX ORDER — METHOTREXATE 2.5 MG/1
1 TABLET ORAL
Qty: 0 | Refills: 0 | DISCHARGE

## 2021-10-21 RX ORDER — DOCUSATE SODIUM 100 MG
100 CAPSULE ORAL
Qty: 0 | Refills: 0 | DISCHARGE

## 2021-10-21 RX ORDER — METOPROLOL TARTRATE 50 MG
12.5 TABLET ORAL
Refills: 0 | Status: DISCONTINUED | OUTPATIENT
Start: 2021-10-21 | End: 2021-10-28

## 2021-10-21 RX ORDER — ACETAMINOPHEN 500 MG
650 TABLET ORAL EVERY 6 HOURS
Refills: 0 | Status: DISCONTINUED | OUTPATIENT
Start: 2021-10-21 | End: 2021-10-28

## 2021-10-21 RX ORDER — ACETAMINOPHEN 500 MG
650 TABLET ORAL ONCE
Refills: 0 | Status: COMPLETED | OUTPATIENT
Start: 2021-10-21 | End: 2021-10-21

## 2021-10-21 RX ORDER — ERGOCALCIFEROL 1.25 MG/1
1 CAPSULE ORAL
Qty: 0 | Refills: 0 | DISCHARGE

## 2021-10-21 RX ORDER — WARFARIN SODIUM 2.5 MG/1
5 TABLET ORAL ONCE
Refills: 0 | Status: COMPLETED | OUTPATIENT
Start: 2021-10-21 | End: 2021-10-21

## 2021-10-21 RX ORDER — CEFTRIAXONE 500 MG/1
1000 INJECTION, POWDER, FOR SOLUTION INTRAMUSCULAR; INTRAVENOUS EVERY 24 HOURS
Refills: 0 | Status: COMPLETED | OUTPATIENT
Start: 2021-10-21 | End: 2021-10-23

## 2021-10-21 RX ORDER — RAMIPRIL 5 MG
1 CAPSULE ORAL
Qty: 0 | Refills: 0 | DISCHARGE

## 2021-10-21 RX ORDER — FUROSEMIDE 40 MG
40 TABLET ORAL ONCE
Refills: 0 | Status: COMPLETED | OUTPATIENT
Start: 2021-10-21 | End: 2021-10-21

## 2021-10-21 RX ORDER — ETANERCEPT 25 MG
50 VIAL (EA) SUBCUTANEOUS
Qty: 0 | Refills: 0 | DISCHARGE

## 2021-10-21 RX ORDER — LISINOPRIL 2.5 MG/1
10 TABLET ORAL DAILY
Refills: 0 | Status: DISCONTINUED | OUTPATIENT
Start: 2021-10-21 | End: 2021-10-28

## 2021-10-21 RX ORDER — FOLIC ACID 0.8 MG
1 TABLET ORAL
Qty: 0 | Refills: 0 | DISCHARGE

## 2021-10-21 RX ORDER — FUROSEMIDE 40 MG
1 TABLET ORAL
Qty: 0 | Refills: 0 | DISCHARGE

## 2021-10-21 RX ORDER — LANOLIN ALCOHOL/MO/W.PET/CERES
3 CREAM (GRAM) TOPICAL AT BEDTIME
Refills: 0 | Status: DISCONTINUED | OUTPATIENT
Start: 2021-10-21 | End: 2021-10-28

## 2021-10-21 RX ADMIN — Medication 650 MILLIGRAM(S): at 14:56

## 2021-10-21 RX ADMIN — Medication 650 MILLIGRAM(S): at 21:29

## 2021-10-21 RX ADMIN — Medication 12.5 MILLIGRAM(S): at 16:57

## 2021-10-21 RX ADMIN — Medication 40 MILLIGRAM(S): at 11:59

## 2021-10-21 RX ADMIN — CEFTRIAXONE 100 MILLIGRAM(S): 500 INJECTION, POWDER, FOR SOLUTION INTRAMUSCULAR; INTRAVENOUS at 16:57

## 2021-10-21 RX ADMIN — WARFARIN SODIUM 5 MILLIGRAM(S): 2.5 TABLET ORAL at 23:09

## 2021-10-21 RX ADMIN — Medication 650 MILLIGRAM(S): at 20:59

## 2021-10-21 NOTE — ED PROVIDER NOTE - NSICDXPASTSURGICALHX_GEN_ALL_CORE_FT
PAST SURGICAL HISTORY:  S/P cholecystectomy     S/P colonoscopy with polypectomy     S/P hysterectomy

## 2021-10-21 NOTE — H&P ADULT - NSHPLABSRESULTS_GEN_ALL_CORE
LABS:                        12.2   10.46 )-----------( 172      ( 21 Oct 2021 12:01 )             38.0     10-21    135  |  97<L>  |  17  ----------------------------<  118<H>  4.3   |  26  |  0.43<L>    Ca    9.4      21 Oct 2021 12:01    TPro  7.2  /  Alb  4.3  /  TBili  2.6<H>  /  DBili  x   /  AST  41<H>  /  ALT  42<H>  /  AlkPhos  83  10-21              RADIOLOGY & ADDITIONAL TESTS:    Imaging Personally Reviewed:    EKG reviewed: Atrial fibrillation VR 89, [id number]. Prominent T waves in V3-5. LABS:                        12.2   10.46 )-----------( 172      ( 21 Oct 2021 12:01 )             38.0     10-21    135  |  97<L>  |  17  ----------------------------<  118<H>  4.3   |  26  |  0.43<L>    Ca    9.4      21 Oct 2021 12:01    TPro  7.2  /  Alb  4.3  /  TBili  2.6<H>  /  DBili  x   /  AST  41<H>  /  ALT  42<H>  /  AlkPhos  83  10-21        Trop 20.      RADIOLOGY & ADDITIONAL TESTS:    Imaging Personally Reviewed:    EKG reviewed: Atrial fibrillation VR 89, [id number]. Prominent T waves in V3-5. No EKG available for review.     Prior TTE results reviewed from 2018 notable for dilated LA, severe R atrial enlargement, RVSP 61.

## 2021-10-21 NOTE — H&P ADULT - ASSESSMENT
89yoF p/w SOB that started 2 days prior to admission suddenly when she woke up in the morning. PMH notable for a fib s/p cardioversion (on warfarin), HTN, asthma (? per pulmonology note however pt does not endorse this and is not on inhalers at home), severe pulmonary HTN (last tte 2018), psoriatic athritis (on MTX and etanercept), and chronic lower extremity edema. She is being treated for a presumed HFpEF exacerbation with IV diuresis given elevated proBNP on admission. She is also being treated for CAP with ceftriaxone in the setting of leukocytosis and ongoing cough. CTA chest in ED pending to r/o PE, unlikely given pt adherence w warfarin.

## 2021-10-21 NOTE — ED PROVIDER NOTE - OBJECTIVE STATEMENT
89F w pmhx of afib on AC, HTN, LE edema. Presenting with CC of pain along the back occasionally radiating to the front/abd that she woke up with. Associated w SOB. On Lasix 40mg but takes half in order to avoid peeing frequently. No new orthopnea or new LE edema. No chest pain. No home O2. Walk w walker, limited mobility 2/2 to femur fracture. Denies     Per chart review, last EF 68% in 2019. 89F w pmhx of afib on AC, HTN, LE edema. Presenting with CC of pain along the back occasionally radiating to the front/abd that she woke up with. Associated w SOB. On Lasix 40mg but takes half in order to avoid peeing frequently. No new orthopnea or new LE edema, but has chronic b/l LE swelling. No chest pain. No home O2. Walk w walker, limited mobility 2/2 to femur fracture. Denies fevers/chills, chest pain, abdominal pain. PCP: Dr. Praneeth Kate    Per chart review, last EF 68% in 2019.

## 2021-10-21 NOTE — H&P ADULT - NSHPREVIEWOFSYSTEMS_GEN_ALL_CORE
Review of Systems:   CONSTITUTIONAL: No fever, no fatigue  EYES: No eye pain or discharge  ENMT:  No sinus or throat pain  NECK: No pain or stiffness  RESPIRATORY: No  chills or hemoptysis;  CARDIOVASCULAR: No chest pain, palpitations, dizziness, or leg swelling  GASTROINTESTINAL: No abdominal or epigastric pain. No nausea, vomiting, or hematemesis; No diarrhea or constipation. No melena or hematochezia.  GENITOURINARY: No dysuria or incontinence  MUSCULOSKELETAL: No joint pain  NEUROLOGICAL: No headaches, memory loss, loss of strength, numbness, or tremors  PSYCHIATRIC: No depression or difficulty sleeping  SKIN: No rashes, no skin changes

## 2021-10-21 NOTE — ED PROVIDER NOTE - ATTENDING CONTRIBUTION TO CARE
I have personally performed a face to face medical and diagnostic evaluation of the patient. I have discussed with and reviewed the Resident's note and agree with the History, ROS, Physical Exam and MDM unless otherwise indicated. A brief summary of my personal evaluation and impression can be found below.    89F hx of fib on AC, HTN, LE edema, on lasix but intermittently compliant presents with a cc of back pain a/w radiation to fron ot chest and abdomen also a/w SOB also thinks LE edema is getting worse. No new orthopnea or SOB however she feels that it is difficult for her to lie flat. +cough, non productive, no fever.  Denies n/v/f/c/. Denies headache, syncope, lightheadedness, dizziness. Denies chest palpitations, abdominal pain. Denies edema. Denies dysuria, hematuria, BRBPR, tarry stools, diarrhea, constipation.     All other ROS negative, except as above and as per HPI and ROS section.    VITALS: Initial triage and subsequent vitals have been reviewed by me.  GEN APPEARANCE: WDWN, alert, non-toxic, NAD  HEAD: Atraumatic.  EYES: PERRLa, EOMI, vision grossly intact.   NECK: Supple  CV: RRR, S1S2, no c/r/m/g. Cap refill <2 seconds. No bruits.   LUNGS: trace crackles b/l   ABDOMEN: Soft, NTND. No guarding or rebound.   MSK/EXT: No spinal or extremity point tenderness. No CVA ttp. Pelvis stable. b/l +2 pitting peripheral edema.  NEURO: Alert, follows commands. Weight bearing normal. Speech normal. Sensation and motor normal x4 extremities.   SKIN: Warm, dry and intact. No rash.  PSYCH: Appropriate    Plan/MDM: 89F hx of fib on AC, HTN, LE edema, on lasix but intermittently compliant presents with a cc of back pain a/w radiation to fron ot chest and abdomen also a/w SOB also thinks LE edema is getting worse. No new orthopnea or SOB however she feels that it is difficult for her to lie flat. +cough, non productive, no fever. exam vss mild hypoxia 93% on RA w b/l pitting LE edema c/f likely chf exacerbation given med non compliance plan to check labs cxr cardiacs abdomen is non tender and no fever low c/f intraabdominal surg process, likely admit thereafter.

## 2021-10-21 NOTE — ED ADULT NURSE REASSESSMENT NOTE - NS ED NURSE REASSESS COMMENT FT1
Pt received from EVE Leonard. Pt currently resting in bed, NAD noted. A&Ox4, neg sob, denies chest pain. Afib on tele. Pending CT. Will continue to monitor.

## 2021-10-21 NOTE — H&P ADULT - NSICDXPASTSURGICALHX_GEN_ALL_CORE_FT
PAST SURGICAL HISTORY:  Fracture of neck of right femur     S/P cholecystectomy     S/P colonoscopy with polypectomy     S/P hysterectomy

## 2021-10-21 NOTE — H&P ADULT - PROBLEM SELECTOR PLAN 2
Leukocytosis on admission. RVP and covid 19 negative.   - Ceftriaxone 1 g daily x 3 days. No azithro given neg RVP for atypicals  - Daily CBC

## 2021-10-21 NOTE — H&P ADULT - HISTORY OF PRESENT ILLNESS
89yoF p/w SOB.  89yoF p/w SOB that started 2 days prior to admission suddenly when she woke up in the morning. PMH notable for a fib s/p cardioversion (on warfarin), HTN, asthma,  89yoF p/w SOB that started 2 days prior to admission suddenly when she woke up in the morning. PMH notable for a fib s/p cardioversion (on warfarin), HTN, asthma (? per pulmonology note however pt does not endorse this and is not on inhalers at home), psoriatic athritis, and chronic lower extremity edema. Pt also endorses some bilateral back pain that has been bothersome when she moves. Endorses HARPER. States she was in her usual state of health until Tues morning. Lives at home alone, ambulates with a walker. Does endorse taking 20  mg daily of lasix instead of the prescribed 40 mg daily of lasix because she does not like having to urinate all day as a consequence.  Endorses a cough that has been ongoing for the past 2 months. Today is having some wheezing. Endorses orthopnea that has been an issue for many years. Denies feeling like her heart was racing. States that she stays in atrial fibrillation but cannot feel it. Denies sick contacts. Is vaccinated against covid 19, last dose April 2021. Endorses pleuritic back pain. Sys she does have LE edema, but it is not worse than usual. She is otherwise adherent with her medications including her warfarin. 89yoF p/w SOB that started 2 days prior to admission suddenly when she woke up in the morning. PMH notable for a fib s/p cardioversion (on warfarin), HTN, asthma (? per pulmonology note however pt does not endorse this and is not on inhalers at home), severe pulmonary HTN (last tte 2018), psoriatic athritis, and chronic lower extremity edema. Pt also endorses some bilateral back pain that has been bothersome when she moves. Endorses HARPER. States she was in her usual state of health until Tues morning. Lives at home alone, ambulates with a walker. Does endorse taking 20  mg daily of lasix instead of the prescribed 40 mg daily of lasix because she does not like having to urinate all day as a consequence.  Endorses a cough that has been ongoing for the past 2 months. Today is having some wheezing. Endorses orthopnea that has been an issue for many years. Denies feeling like her heart was racing. States that she stays in atrial fibrillation but cannot feel it. Denies sick contacts. Is vaccinated against covid 19, last dose April 2021. Endorses pleuritic back pain. Sys she does have LE edema, but it is not worse than usual. She is otherwise adherent with her medications including her warfarin.

## 2021-10-21 NOTE — H&P ADULT - PROBLEM SELECTOR PLAN 1
Potential precipitants include myocardial ischemia, arrhythmia, acute valvular dysfunction, COPD, PE,  diet/med nonadherence.  Plan:   - daily standing weights, strict I/Os, telemetry  - low Na diet w fluid restriction to 1500 cc daily  - oxygen to maintain spo2 >92%  - bolus dosing with lasix 40 mg IV   - troponin to peak  - metop succinate 25 mg daily switched to metop tartrate 12.5 mg BID while hospitalized  - ramipril 2.5 mg daily switched to lisinopril 10 mg daily  - F/u TTE results  - Daily BMP, mag. replete lytes

## 2021-10-21 NOTE — ED PROVIDER NOTE - PHYSICAL EXAMINATION
Const: Well-nourished, Well-developed, appearing stated age.  Eyes: no conjunctival injection, and symmetrical lids.  HEENT: Head NCAT, no lesions. Atraumatic external nose and ears.   Neck: Symmetric, trachea midline.   CVS: +S1/S2, Peripheral pulses 2+ and equal in all extremities.  RESP: Unlabored respiratory effort. Trace crackles b/l.   GI: Nontender/Nondistended, No CVA tenderness b/l.   MSK: Normocephalic/Atraumatic, +pitting edema b/l LE.   Skin: Warm, dry and intact.   Neuro: Fluent speech. Motor & Sensation grossly intact.  Psych: Awake, Alert, & Oriented (AAO) x3. Appropriate mood and affect.

## 2021-10-21 NOTE — H&P ADULT - NSHPPHYSICALEXAM_GEN_ALL_CORE
Vital Signs Last 24 Hrs  T(C): 36.7 (21 Oct 2021 14:32), Max: 36.7 (21 Oct 2021 14:32)  T(F): 98.1 (21 Oct 2021 14:32), Max: 98.1 (21 Oct 2021 14:32)  HR: 79 (21 Oct 2021 14:32) (75 - 79)  BP: 178/75 (21 Oct 2021 14:32) (174/69 - 178/75)  BP(mean): --  RR: 18 (21 Oct 2021 14:32) (18 - 24)  SpO2: 96% (21 Oct 2021 14:32) (95% - 96%)    CONSTITUTIONAL: Well-groomed, in mild apparent distress  EYES: No conjunctival or scleral injection, non-icteric; PERRLA and symmetric  ENMT: No external nasal lesions; nasal mucosa not inflamed; normal dentition; no pharyngeal injection or exudates, oral mucosa with moist membranes  NECK: Trachea midline without palpable neck mass; thyroid not enlarged and non-tender  RESPIRATORY: Breathing comfortably on 2L NC; no dullness to percussion; lung sounds distant with crackles auscletates bilaterally, worse on the RL posterior lung field, faint wheeze, no JVD appreciated  CARDIOVASCULAR: +S1S2, RRR, no M/G/R; no carotid bruits; pedal pulses full and symmetric; 3+ pitting edema of the LEs symmetric bilaterally  GASTROINTESTINAL: No palpable masses or tenderness, +BS throughout, no rebound/guarding; no hepatosplenomegaly  MUSCULOSKELETAL: No digital clubbing or cyanosis  SKIN: No rashes or ulcers noted; no subcutaneous nodules or induration palpable  NEUROLOGIC: CN II-XII grossly intact  PSYCHIATRIC: A+O x 3; mood and affect appropriate; appropriate insight and judgment

## 2021-10-21 NOTE — ED ADULT NURSE NOTE - OBJECTIVE STATEMENT
Awake and alert c/o back pain and sob x 3 days. IV placed, labs sent covid swab sent. Patient has a h/o afib, no chest pain . Lasix given as ordered, call bell in reach.

## 2021-10-21 NOTE — ED PROVIDER NOTE - CLINICAL SUMMARY MEDICAL DECISION MAKING FREE TEXT BOX
89F presenting for SOB, back pain. On exam, O2 sat 93/94% on RA, improved w O2 NC. +b/l LE edema and trace crackles at lung bases. Will eval for CHF exacerbation vs other cardiac/resp cause. Plan: Labs, cxr, lasix, reassess. Tran Cosby, EM PGY3

## 2021-10-21 NOTE — H&P ADULT - NSHPSOCIALHISTORY_GEN_ALL_CORE
Denies ETOH use, tobacco use, or drug use. States she smoked for about 10 years total, quit 40 years ago.

## 2021-10-22 DIAGNOSIS — I27.0 PRIMARY PULMONARY HYPERTENSION: ICD-10-CM

## 2021-10-22 LAB
ANION GAP SERPL CALC-SCNC: 12 MMOL/L — SIGNIFICANT CHANGE UP (ref 7–14)
BUN SERPL-MCNC: 19 MG/DL — SIGNIFICANT CHANGE UP (ref 7–23)
CALCIUM SERPL-MCNC: 9 MG/DL — SIGNIFICANT CHANGE UP (ref 8.4–10.5)
CHLORIDE SERPL-SCNC: 96 MMOL/L — LOW (ref 98–107)
CO2 SERPL-SCNC: 27 MMOL/L — SIGNIFICANT CHANGE UP (ref 22–31)
COVID-19 SPIKE DOMAIN AB INTERP: POSITIVE
COVID-19 SPIKE DOMAIN ANTIBODY RESULT: 156 U/ML — HIGH
CREAT SERPL-MCNC: 0.47 MG/DL — LOW (ref 0.5–1.3)
GLUCOSE BLDC GLUCOMTR-MCNC: 107 MG/DL — HIGH (ref 70–99)
GLUCOSE SERPL-MCNC: 90 MG/DL — SIGNIFICANT CHANGE UP (ref 70–99)
HCT VFR BLD CALC: 35.1 % — SIGNIFICANT CHANGE UP (ref 34.5–45)
HGB BLD-MCNC: 11.8 G/DL — SIGNIFICANT CHANGE UP (ref 11.5–15.5)
INR BLD: 2.28 RATIO — HIGH (ref 0.88–1.16)
MCHC RBC-ENTMCNC: 33.6 GM/DL — SIGNIFICANT CHANGE UP (ref 32–36)
MCHC RBC-ENTMCNC: 33.7 PG — SIGNIFICANT CHANGE UP (ref 27–34)
MCV RBC AUTO: 100.3 FL — HIGH (ref 80–100)
NRBC # BLD: 0 /100 WBCS — SIGNIFICANT CHANGE UP
NRBC # FLD: 0 K/UL — SIGNIFICANT CHANGE UP
PLATELET # BLD AUTO: 141 K/UL — LOW (ref 150–400)
POTASSIUM SERPL-MCNC: 3.7 MMOL/L — SIGNIFICANT CHANGE UP (ref 3.5–5.3)
POTASSIUM SERPL-SCNC: 3.7 MMOL/L — SIGNIFICANT CHANGE UP (ref 3.5–5.3)
PROTHROM AB SERPL-ACNC: 25.2 SEC — HIGH (ref 10.6–13.6)
RBC # BLD: 3.5 M/UL — LOW (ref 3.8–5.2)
RBC # FLD: 17.8 % — HIGH (ref 10.3–14.5)
SARS-COV-2 IGG+IGM SERPL QL IA: 156 U/ML — HIGH
SARS-COV-2 IGG+IGM SERPL QL IA: POSITIVE
SODIUM SERPL-SCNC: 135 MMOL/L — SIGNIFICANT CHANGE UP (ref 135–145)
WBC # BLD: 9.36 K/UL — SIGNIFICANT CHANGE UP (ref 3.8–10.5)
WBC # FLD AUTO: 9.36 K/UL — SIGNIFICANT CHANGE UP (ref 3.8–10.5)

## 2021-10-22 PROCEDURE — 99232 SBSQ HOSP IP/OBS MODERATE 35: CPT

## 2021-10-22 RX ORDER — INFLUENZA VIRUS VACCINE 15; 15; 15; 15 UG/.5ML; UG/.5ML; UG/.5ML; UG/.5ML
0.5 SUSPENSION INTRAMUSCULAR ONCE
Refills: 0 | Status: DISCONTINUED | OUTPATIENT
Start: 2021-10-22 | End: 2021-10-22

## 2021-10-22 RX ORDER — FUROSEMIDE 40 MG
20 TABLET ORAL DAILY
Refills: 0 | Status: DISCONTINUED | OUTPATIENT
Start: 2021-10-22 | End: 2021-10-25

## 2021-10-22 RX ORDER — INFLUENZA VIRUS VACCINE 15; 15; 15; 15 UG/.5ML; UG/.5ML; UG/.5ML; UG/.5ML
0.7 SUSPENSION INTRAMUSCULAR ONCE
Refills: 0 | Status: DISCONTINUED | OUTPATIENT
Start: 2021-10-22 | End: 2021-10-28

## 2021-10-22 RX ORDER — WARFARIN SODIUM 2.5 MG/1
2.5 TABLET ORAL ONCE
Refills: 0 | Status: COMPLETED | OUTPATIENT
Start: 2021-10-22 | End: 2021-10-22

## 2021-10-22 RX ORDER — POLYETHYLENE GLYCOL 3350 17 G/17G
17 POWDER, FOR SOLUTION ORAL DAILY
Refills: 0 | Status: DISCONTINUED | OUTPATIENT
Start: 2021-10-22 | End: 2021-10-28

## 2021-10-22 RX ORDER — SENNA PLUS 8.6 MG/1
2 TABLET ORAL AT BEDTIME
Refills: 0 | Status: DISCONTINUED | OUTPATIENT
Start: 2021-10-22 | End: 2021-10-28

## 2021-10-22 RX ADMIN — Medication 20 MILLIGRAM(S): at 17:10

## 2021-10-22 RX ADMIN — POLYETHYLENE GLYCOL 3350 17 GRAM(S): 17 POWDER, FOR SOLUTION ORAL at 17:10

## 2021-10-22 RX ADMIN — Medication 650 MILLIGRAM(S): at 18:00

## 2021-10-22 RX ADMIN — Medication 650 MILLIGRAM(S): at 02:15

## 2021-10-22 RX ADMIN — Medication 12.5 MILLIGRAM(S): at 17:09

## 2021-10-22 RX ADMIN — LISINOPRIL 10 MILLIGRAM(S): 2.5 TABLET ORAL at 05:38

## 2021-10-22 RX ADMIN — SENNA PLUS 2 TABLET(S): 8.6 TABLET ORAL at 22:07

## 2021-10-22 RX ADMIN — Medication 650 MILLIGRAM(S): at 03:15

## 2021-10-22 RX ADMIN — WARFARIN SODIUM 2.5 MILLIGRAM(S): 2.5 TABLET ORAL at 17:09

## 2021-10-22 RX ADMIN — Medication 650 MILLIGRAM(S): at 17:09

## 2021-10-22 RX ADMIN — Medication 650 MILLIGRAM(S): at 10:20

## 2021-10-22 RX ADMIN — Medication 12.5 MILLIGRAM(S): at 05:37

## 2021-10-22 RX ADMIN — Medication 650 MILLIGRAM(S): at 09:38

## 2021-10-22 RX ADMIN — CEFTRIAXONE 100 MILLIGRAM(S): 500 INJECTION, POWDER, FOR SOLUTION INTRAMUSCULAR; INTRAVENOUS at 17:37

## 2021-10-22 NOTE — PROGRESS NOTE ADULT - PROBLEM SELECTOR PLAN 1
Potential precipitants include myocardial ischemia, arrhythmia, acute valvular dysfunction, COPD, PE,  diet/med nonadherence.  - Daily standing weights, strict I/Os, telemetry  - Low Na diet w fluid restriction to 1500 cc daily  - Oxygen to maintain spo2 >92%, titrate as needed.   - Continue IV Lasix 20mg daily, tolerating well.   - Toprol XL 25 mg daily-> Metop tartrate 12.5 mg BID while hospitalized  - Ramipril 2.5 mg daily switched to Lisinopril 10 mg daily.   - FOLLOW UP ECHO.   - Daily BMP, Mg replete lytes.

## 2021-10-22 NOTE — PROGRESS NOTE ADULT - ASSESSMENT
A 89yoF PMH notable for a fib s/p cardioversion (on Warfarin), HTN, asthma. ? per pulmonology note however pt does not endorse this and is not on inhalers at home), severe pulmonary HTN (last tte 2018), psoriatic athritis (on MTX and etanercept), and chronic lower extremity edema p/w SOB that started 2 days prior to admission admitted for presumed HFpEF exacerbation with IV diuresis given elevated proBNP on admissio as well as CAP with Ceftriaxone in the setting of leukocytosis and ongoing cough.

## 2021-10-22 NOTE — PROGRESS NOTE ADULT - SUBJECTIVE AND OBJECTIVE BOX
PROGRESS NOTE:     Patient is a 89y old  Female who presents with a chief complaint of SOB (21 Oct 2021 15:13)    SUBJECTIVE / OVERNIGHT EVENTS: Patient seen and evaluated at bedside. No acute distress evident, no overnight events. Remains on 4 L NC, but reports overall improvement in SOB. No sob, chest pain, abdominal pain, nausea, vomiting, fever, chills, or further complaints. States her LE /foot swelling has improved since admission.      ADDITIONAL REVIEW OF SYSTEMS:    MEDICATIONS  (STANDING):  cefTRIAXone   IVPB 1000 milliGRAM(s) IV Intermittent every 24 hours  furosemide   Injectable 20 milliGRAM(s) IV Push daily  influenza  Vaccine (HIGH DOSE) 0.7 milliLiter(s) IntraMuscular once  lisinopril 10 milliGRAM(s) Oral daily  metoprolol tartrate 12.5 milliGRAM(s) Oral two times a day  senna 2 Tablet(s) Oral at bedtime  warfarin 2.5 milliGRAM(s) Oral once    MEDICATIONS  (PRN):  acetaminophen     Tablet .. 650 milliGRAM(s) Oral every 6 hours PRN Temp greater or equal to 38C (100.4F), Mild Pain (1 - 3)  melatonin 3 milliGRAM(s) Oral at bedtime PRN Insomnia  polyethylene glycol 3350 17 Gram(s) Oral daily PRN Constipation    CAPILLARY BLOOD GLUCOSE    POCT Blood Glucose.: 107 mg/dL (22 Oct 2021 00:53)    I&O's Summary    21 Oct 2021 07:01  -  22 Oct 2021 07:00  --------------------------------------------------------  IN: 400 mL / OUT: 200 mL / NET: 200 mL    PHYSICAL EXAM:  Vital Signs Last 24 Hrs  T(C): 36.3 (22 Oct 2021 05:30), Max: 37 (21 Oct 2021 18:57)  T(F): 97.4 (22 Oct 2021 05:30), Max: 98.6 (21 Oct 2021 18:57)  HR: 66 (22 Oct 2021 05:30) (66 - 93)  BP: 127/70 (22 Oct 2021 05:30) (127/70 - 178/75)  BP(mean): --  RR: 18 (22 Oct 2021 05:30) (18 - 24)  SpO2: 98% (22 Oct 2021 05:30) (96% - 100%)    CONSTITUTIONAL: NAD, well-developed, elderly comfortable appearing.   RESPIRATORY: Normal respiratory effort; lungs are clear to auscultation bilaterally  CARDIOVASCULAR: Regular rate and rhythm, normal S1 and S2   No lower extremity edema; Peripheral pulses are 2+ bilaterally  ABDOMEN: Nontender to palpation, normoactive bowel sounds  MUSCLOSKELETAL: no clubbing or cyanosis of digits; no joint swelling or tenderness to palpation  PSYCH: A+O to person, place, and time; affect appropriate    LABS: reviewed.                         11.8   9.36  )-----------( 141      ( 22 Oct 2021 06:34 )             35.1     10-22    135  |  96<L>  |  19  ----------------------------<  90  3.7   |  27  |  0.47<L>    Ca    9.0      22 Oct 2021 06:34    TPro  7.2  /  Alb  4.3  /  TBili  2.6<H>  /  DBili  x   /  AST  41<H>  /  ALT  42<H>  /  AlkPhos  83  10-21    PT/INR - ( 22 Oct 2021 06:34 )   PT: 25.2 sec;   INR: 2.28 ratio      RADIOLOGY & ADDITIONAL TESTS:  Results Reviewed:   Imaging Personally Reviewed:  Electrocardiogram Personally Reviewed:    COORDINATION OF CARE:  Care Discussed with Consultants/Other Providers [Y/N]:  Prior or Outpatient Records Reviewed [Y/N]:

## 2021-10-23 LAB
ANION GAP SERPL CALC-SCNC: 9 MMOL/L — SIGNIFICANT CHANGE UP (ref 7–14)
BASOPHILS # BLD AUTO: 0.01 K/UL — SIGNIFICANT CHANGE UP (ref 0–0.2)
BASOPHILS NFR BLD AUTO: 0.1 % — SIGNIFICANT CHANGE UP (ref 0–2)
BUN SERPL-MCNC: 23 MG/DL — SIGNIFICANT CHANGE UP (ref 7–23)
CALCIUM SERPL-MCNC: 9.6 MG/DL — SIGNIFICANT CHANGE UP (ref 8.4–10.5)
CHLORIDE SERPL-SCNC: 97 MMOL/L — LOW (ref 98–107)
CO2 SERPL-SCNC: 29 MMOL/L — SIGNIFICANT CHANGE UP (ref 22–31)
CREAT SERPL-MCNC: 0.51 MG/DL — SIGNIFICANT CHANGE UP (ref 0.5–1.3)
EOSINOPHIL # BLD AUTO: 0.08 K/UL — SIGNIFICANT CHANGE UP (ref 0–0.5)
EOSINOPHIL NFR BLD AUTO: 1 % — SIGNIFICANT CHANGE UP (ref 0–6)
GLUCOSE SERPL-MCNC: 92 MG/DL — SIGNIFICANT CHANGE UP (ref 70–99)
HCT VFR BLD CALC: 37.7 % — SIGNIFICANT CHANGE UP (ref 34.5–45)
HGB BLD-MCNC: 11.9 G/DL — SIGNIFICANT CHANGE UP (ref 11.5–15.5)
IANC: 6.3 K/UL — SIGNIFICANT CHANGE UP (ref 1.5–8.5)
IMM GRANULOCYTES NFR BLD AUTO: 0.2 % — SIGNIFICANT CHANGE UP (ref 0–1.5)
INR BLD: 2.48 RATIO — HIGH (ref 0.88–1.16)
LYMPHOCYTES # BLD AUTO: 1.02 K/UL — SIGNIFICANT CHANGE UP (ref 1–3.3)
LYMPHOCYTES # BLD AUTO: 12.2 % — LOW (ref 13–44)
MAGNESIUM SERPL-MCNC: 1.9 MG/DL — SIGNIFICANT CHANGE UP (ref 1.6–2.6)
MCHC RBC-ENTMCNC: 31.6 GM/DL — LOW (ref 32–36)
MCHC RBC-ENTMCNC: 33.1 PG — SIGNIFICANT CHANGE UP (ref 27–34)
MCV RBC AUTO: 104.7 FL — HIGH (ref 80–100)
MONOCYTES # BLD AUTO: 0.92 K/UL — HIGH (ref 0–0.9)
MONOCYTES NFR BLD AUTO: 11 % — SIGNIFICANT CHANGE UP (ref 2–14)
NEUTROPHILS # BLD AUTO: 6.3 K/UL — SIGNIFICANT CHANGE UP (ref 1.8–7.4)
NEUTROPHILS NFR BLD AUTO: 75.5 % — SIGNIFICANT CHANGE UP (ref 43–77)
NRBC # BLD: 0 /100 WBCS — SIGNIFICANT CHANGE UP
NRBC # FLD: 0 K/UL — SIGNIFICANT CHANGE UP
PHOSPHATE SERPL-MCNC: 3.2 MG/DL — SIGNIFICANT CHANGE UP (ref 2.5–4.5)
PLATELET # BLD AUTO: 157 K/UL — SIGNIFICANT CHANGE UP (ref 150–400)
POTASSIUM SERPL-MCNC: 4.2 MMOL/L — SIGNIFICANT CHANGE UP (ref 3.5–5.3)
POTASSIUM SERPL-SCNC: 4.2 MMOL/L — SIGNIFICANT CHANGE UP (ref 3.5–5.3)
PROTHROM AB SERPL-ACNC: 27.3 SEC — HIGH (ref 10.6–13.6)
RBC # BLD: 3.6 M/UL — LOW (ref 3.8–5.2)
RBC # FLD: 17.6 % — HIGH (ref 10.3–14.5)
SODIUM SERPL-SCNC: 135 MMOL/L — SIGNIFICANT CHANGE UP (ref 135–145)
WBC # BLD: 8.35 K/UL — SIGNIFICANT CHANGE UP (ref 3.8–10.5)
WBC # FLD AUTO: 8.35 K/UL — SIGNIFICANT CHANGE UP (ref 3.8–10.5)

## 2021-10-23 PROCEDURE — 99232 SBSQ HOSP IP/OBS MODERATE 35: CPT

## 2021-10-23 PROCEDURE — 99223 1ST HOSP IP/OBS HIGH 75: CPT | Mod: GC

## 2021-10-23 RX ORDER — IPRATROPIUM/ALBUTEROL SULFATE 18-103MCG
3 AEROSOL WITH ADAPTER (GRAM) INHALATION EVERY 6 HOURS
Refills: 0 | Status: DISCONTINUED | OUTPATIENT
Start: 2021-10-23 | End: 2021-10-28

## 2021-10-23 RX ORDER — WARFARIN SODIUM 2.5 MG/1
5 TABLET ORAL ONCE
Refills: 0 | Status: COMPLETED | OUTPATIENT
Start: 2021-10-23 | End: 2021-10-23

## 2021-10-23 RX ADMIN — SENNA PLUS 2 TABLET(S): 8.6 TABLET ORAL at 21:26

## 2021-10-23 RX ADMIN — CEFTRIAXONE 100 MILLIGRAM(S): 500 INJECTION, POWDER, FOR SOLUTION INTRAMUSCULAR; INTRAVENOUS at 18:12

## 2021-10-23 RX ADMIN — Medication 650 MILLIGRAM(S): at 10:30

## 2021-10-23 RX ADMIN — Medication 650 MILLIGRAM(S): at 18:58

## 2021-10-23 RX ADMIN — Medication 650 MILLIGRAM(S): at 19:30

## 2021-10-23 RX ADMIN — Medication 650 MILLIGRAM(S): at 03:15

## 2021-10-23 RX ADMIN — POLYETHYLENE GLYCOL 3350 17 GRAM(S): 17 POWDER, FOR SOLUTION ORAL at 18:12

## 2021-10-23 RX ADMIN — Medication 650 MILLIGRAM(S): at 02:15

## 2021-10-23 RX ADMIN — WARFARIN SODIUM 5 MILLIGRAM(S): 2.5 TABLET ORAL at 18:12

## 2021-10-23 RX ADMIN — Medication 12.5 MILLIGRAM(S): at 18:14

## 2021-10-23 RX ADMIN — Medication 3 MILLILITER(S): at 22:40

## 2021-10-23 RX ADMIN — Medication 650 MILLIGRAM(S): at 11:30

## 2021-10-23 RX ADMIN — Medication 12.5 MILLIGRAM(S): at 05:40

## 2021-10-23 RX ADMIN — LISINOPRIL 10 MILLIGRAM(S): 2.5 TABLET ORAL at 05:41

## 2021-10-23 RX ADMIN — Medication 20 MILLIGRAM(S): at 18:13

## 2021-10-23 NOTE — PROGRESS NOTE ADULT - ASSESSMENT
A 89yoF PMH notable for a fib s/p cardioversion (on Warfarin), HTN, asthma. ? per pulmonology note however pt does not endorse this and is not on inhalers at home), severe pulmonary HTN (last tte 2018), psoriatic athritis (on MTX and etanercept), and chronic lower extremity edema p/w SOB that started 2 days prior to admission admitted for presumed HFpEF exacerbation with IV diuresis given elevated proBNP on admission as well as possible CAP with Ceftriaxone in the setting of leukocytosis and ongoing cough.

## 2021-10-23 NOTE — PROGRESS NOTE ADULT - PROBLEM SELECTOR PLAN 1
Potential precipitants include myocardial ischemia, arrhythmia, restrictive lung disease , Pulm htn, valvular dysfunction, PE,  diet/med nonadherence.  - Daily standing weights, strict I/Os, telemetry  - Low Na diet w fluid restriction to 1500 cc daily  - Oxygen to maintain spo2 >92%, titrate as needed.   - Continue IV Lasix 20mg daily, tolerating well.   - Toprol XL 25 mg daily-> Metop tartrate 12.5 mg BID while hospitalized  - Ramipril 2.5 mg daily switched to Lisinopril 10 mg daily.   - FOLLOW UP ECHO.   - Daily BMP, Mg replete lytes. Potential precipitants include myocardial ischemia, arrhythmia, restrictive lung disease , Pulm htn, valvular dysfunction, PE,  diet/med nonadherence.  - Daily standing weights, strict I/Os, telemetry  - Low Na diet w fluid restriction to 1500 cc daily  - Oxygen to maintain spo2 >92%, titrate as needed.   - Continue IV Lasix 20mg daily, tolerating well.   - Toprol XL 25 mg daily-> Metop tartrate 12.5 mg BID while hospitalized  - Ramipril 2.5 mg daily switched to Lisinopril 10 mg daily.   - FOLLOW UP ECHOcardiogram transthoracic  - Daily BMP, Mg replete lytes.

## 2021-10-23 NOTE — PROGRESS NOTE ADULT - SUBJECTIVE AND OBJECTIVE BOX
PROGRESS NOTE:     Patient is a 89y old  Female who presents with a chief complaint of SOB (23 Oct 2021 06:11)      SUBJECTIVE / OVERNIGHT EVENTS:    ADDITIONAL REVIEW OF SYSTEMS:    MEDICATIONS  (STANDING):  albuterol/ipratropium for Nebulization 3 milliLiter(s) Nebulizer every 6 hours  furosemide   Injectable 20 milliGRAM(s) IV Push daily  influenza  Vaccine (HIGH DOSE) 0.7 milliLiter(s) IntraMuscular once  lisinopril 10 milliGRAM(s) Oral daily  metoprolol tartrate 12.5 milliGRAM(s) Oral two times a day  senna 2 Tablet(s) Oral at bedtime    MEDICATIONS  (PRN):  acetaminophen     Tablet .. 650 milliGRAM(s) Oral every 6 hours PRN Temp greater or equal to 38C (100.4F), Mild Pain (1 - 3)  melatonin 3 milliGRAM(s) Oral at bedtime PRN Insomnia  polyethylene glycol 3350 17 Gram(s) Oral daily PRN Constipation      CAPILLARY BLOOD GLUCOSE        I&O's Summary    22 Oct 2021 07:01  -  23 Oct 2021 07:00  --------------------------------------------------------  IN: 600 mL / OUT: 1650 mL / NET: -1050 mL    23 Oct 2021 07:01  -  23 Oct 2021 18:52  --------------------------------------------------------  IN: 200 mL / OUT: 500 mL / NET: -300 mL        PHYSICAL EXAM:  Vital Signs Last 24 Hrs  T(C): 37.1 (23 Oct 2021 13:35), Max: 37.1 (23 Oct 2021 13:35)  T(F): 98.7 (23 Oct 2021 13:35), Max: 98.7 (23 Oct 2021 13:35)  HR: 76 (23 Oct 2021 13:35) (71 - 90)  BP: 136/82 (23 Oct 2021 13:35) (120/64 - 146/74)  BP(mean): --  RR: 16 (23 Oct 2021 13:35) (16 - 18)  SpO2: 100% (23 Oct 2021 13:35) (98% - 100%)    CONSTITUTIONAL: NAD, well-developed  RESPIRATORY: Normal respiratory effort; lungs are clear to auscultation bilaterally  CARDIOVASCULAR: Regular rate and rhythm, normal S1 and S2, no murmur/rub/gallop; No lower extremity edema; Peripheral pulses are 2+ bilaterally  ABDOMEN: Nontender to palpation, normoactive bowel sounds, no rebound/guarding; No hepatosplenomegaly  MUSCLOSKELETAL: no clubbing or cyanosis of digits; no joint swelling or tenderness to palpation  PSYCH: A+O to person, place, and time; affect appropriate    LABS:                        11.9   8.35  )-----------( 157      ( 23 Oct 2021 06:58 )             37.7     10-23    135  |  97<L>  |  23  ----------------------------<  92  4.2   |  29  |  0.51    Ca    9.6      23 Oct 2021 06:58  Phos  3.2     10-23  Mg     1.90     10-23      PT/INR - ( 23 Oct 2021 06:58 )   PT: 27.3 sec;   INR: 2.48 ratio                     RADIOLOGY & ADDITIONAL TESTS:  Results Reviewed:   Imaging Personally Reviewed:  Electrocardiogram Personally Reviewed:    COORDINATION OF CARE:  Care Discussed with Consultants/Other Providers [Y/N]:  Prior or Outpatient Records Reviewed [Y/N]:   PROGRESS NOTE:     Patient is a 89y old  Female who presents with a chief complaint of SOB (23 Oct 2021 06:11)      SUBJECTIVE / OVERNIGHT EVENTS:  reports improved symptoms  sob    ADDITIONAL REVIEW OF SYSTEMS:  denies fever or chills   MEDICATIONS  (STANDING):  albuterol/ipratropium for Nebulization 3 milliLiter(s) Nebulizer every 6 hours  furosemide   Injectable 20 milliGRAM(s) IV Push daily  influenza  Vaccine (HIGH DOSE) 0.7 milliLiter(s) IntraMuscular once  lisinopril 10 milliGRAM(s) Oral daily  metoprolol tartrate 12.5 milliGRAM(s) Oral two times a day  senna 2 Tablet(s) Oral at bedtime    MEDICATIONS  (PRN):  acetaminophen     Tablet .. 650 milliGRAM(s) Oral every 6 hours PRN Temp greater or equal to 38C (100.4F), Mild Pain (1 - 3)  melatonin 3 milliGRAM(s) Oral at bedtime PRN Insomnia  polyethylene glycol 3350 17 Gram(s) Oral daily PRN Constipation      CAPILLARY BLOOD GLUCOSE        I&O's Summary    22 Oct 2021 07:01  -  23 Oct 2021 07:00  --------------------------------------------------------  IN: 600 mL / OUT: 1650 mL / NET: -1050 mL    23 Oct 2021 07:01  -  23 Oct 2021 18:52  --------------------------------------------------------  IN: 200 mL / OUT: 500 mL / NET: -300 mL        PHYSICAL EXAM:  Vital Signs Last 24 Hrs  T(C): 37.1 (23 Oct 2021 13:35), Max: 37.1 (23 Oct 2021 13:35)  T(F): 98.7 (23 Oct 2021 13:35), Max: 98.7 (23 Oct 2021 13:35)  HR: 76 (23 Oct 2021 13:35) (71 - 90)  BP: 136/82 (23 Oct 2021 13:35) (120/64 - 146/74)  BP(mean): --  RR: 16 (23 Oct 2021 13:35) (16 - 18)  SpO2: 100% (23 Oct 2021 13:35) (98% - 100%)    CONSTITUTIONAL: NAD, well-developed  RESPIRATORY: diminished air entry bilateral  abdomen soft   no pitting edema lower extremities     LABS:                        11.9   8.35  )-----------( 157      ( 23 Oct 2021 06:58 )             37.7     10-23    135  |  97<L>  |  23  ----------------------------<  92  4.2   |  29  |  0.51    Ca    9.6      23 Oct 2021 06:58  Phos  3.2     10-23  Mg     1.90     10-23      PT/INR - ( 23 Oct 2021 06:58 )   PT: 27.3 sec;   INR: 2.48 ratio           RADIOLOGY & ADDITIONAL TESTS:  Results Reviewed: y  Imaging Personally Reviewed:y  Electrocardiogram Personally Reviewed:y    COORDINATION OF CARE:  Care Discussed with Consultants/Other Providers [Y/N]:y  Prior or Outpatient Records Reviewed [Y/N]:y

## 2021-10-23 NOTE — CONSULT NOTE ADULT - ASSESSMENT
89yoF p/w SOB that started 2 days prior to admission suddenly when she woke up in the morning. PMH notable for a fib s/p cardioversion (on warfarin), HTN, asthma (? per pulmonology note however pt does not endorse this and is not on inhalers at home), severe pulmonary HTN (last tte 2018), psoriatic athritis, and chronic lower extremity edema presenting with worsening HARPER, LE edema.     # Severe pHTN, unclear group likely 1 vs 2  # ADHF  # LE edema  - Hx of severe pHTN, saw Dr. Renteria in clinic x1, no PFT but also no hx of lung disease. CTA without pna or fluid, does have mosaic attenuation and enlarged PA.  - Improving on ABX but mostly likely 2/2 to diruesis   - Pending TTE  - Agree with diuresis given and poor function not likely a candidate for advance therapies for pHTN.   - Please titrate down FIO2  - OOB to chair, duonebs PRN, incentive ismael  - PT eval   -  89yoF p/w SOB that started 2 days prior to admission suddenly when she woke up in the morning. PMH notable for a fib s/p cardioversion (on warfarin), HTN, asthma (? per pulmonology note however pt does not endorse this and is not on inhalers at home), severe pulmonary HTN (last tte 2018), psoriatic athritis, and chronic lower extremity edema presenting with worsening HARPER, LE edema.     # Severe pHTN, unclear group likely 1 vs 2  # ADHF  # LE edema  - Hx of severe pHTN, saw Dr. Renteria in clinic x1, no PFT but also no hx of lung disease. CTA without pna or fluid, does have mosaic attenuation and enlarged PA.  - Improving on ABX but mostly likely 2/2 to diruesis   - Pending TTE  - Agree with diuresis given and poor function not likely a candidate for advance therapies for pHTN.   - Please titrate down FIO2, likely does not need 4L.   - OOB to chair, miladys KINGN, incentive ismael  - PT eval Dae Hyeon Kim [id number]  Pulmonary Critical Care Fellow  Pager [phone]  89yoF p/w SOB that started 2 days prior to admission suddenly when she woke up in the morning. PMH notable for a fib s/p cardioversion (on warfarin), HTN, asthma (? per pulmonology note however pt does not endorse this and is not on inhalers at home), severe pulmonary HTN (last tte 2018), psoriatic athritis, mitral regurgitation and chronic lower extremity edema presenting with worsening HARPER, LE edema.     # Severe pHTN, unclear group suspect 1 vs 2  # ADHF  # LE edema  - Hx of severe pHTN, seen by Dr. Renteria in clinic x1, no PFT but also no hx of lung disease. CTA without pna or fluid, does have mosaic attenuation and enlarged PA.  - Improving on ABX but mostly likely 2/2 to diruesis   - Pending TTE  - Agree with diuresis given and poor function not likely a candidate for advance therapies for pHTN.   - Please titrate down FIO2, likely does not need 4L.   - OOB to chair, miladys PRN, incentive ismael  - PT eval Dae Hyeon Kim MD-[id number]  Pulmonary Critical Care Fellow  Pager [phone]

## 2021-10-23 NOTE — CONSULT NOTE ADULT - SUBJECTIVE AND OBJECTIVE BOX
CHIEF COMPLAINT:    HPI:    PAST MEDICAL & SURGICAL HISTORY:  Psoriasis    HTN    Atrial fibrillation    S/P colonoscopy with polypectomy    S/P cholecystectomy    S/P hysterectomy    Fracture of neck of right femur        FAMILY HISTORY:  No pertinent family history in first degree relatives        SOCIAL HISTORY:  Smoking: [ ] Never Smoked [ ] Former Smoker (__ packs x ___ years) [ ] Current Smoker  (__ packs x ___ years)  Substance Use: [ ] Never Used [ ] Used ____  EtOH Use:  Marital Status: [ ] Single [ ]  [ ]  [ ]   Sexual History:   Occupation:  Recent Travel:  Country of Birth:  Advance Directives:    Allergies    No Known Allergies    Intolerances        HOME MEDICATIONS:  Home Medications:  B Complex 50 oral tablet, extended release: 1 tab(s) orally once a day (21 Oct 2021 16:16)  Colace 100 mg oral capsule: 100 milligram(s) orally once a day, As Needed (21 Oct 2021 16:16)  etanercept 50 mg/mL subcutaneous solution: 50 milligram(s) subcutaneous once a week (21 Oct 2021 16:16)  folic acid 1 mg oral tablet: 1 tab(s) orally once a day (21 Oct 2021 16:16)  furosemide 40 mg oral tablet: 1 tab(s) orally once a day (21 Oct 2021 16:16)  methotrexate 5 mg oral tablet: 1 tab(s) orally once a week (21 Oct 2021 16:16)  metoprolol succinate 25 mg oral tablet, extended release: 1 tab(s) orally once a day (21 Oct 2021 16:16)  ramipril 2.5 mg oral capsule: 1 cap(s) orally once a day (21 Oct 2021 16:16)  Vitamin D2 50 mcg (2000 intl units) oral capsule: 1 cap(s) orally once a day (21 Oct 2021 16:16)  warfarin 5 mg oral tablet: 1 tab(s) orally once a day (21 Oct 2021 16:16)      REVIEW OF SYSTEMS:  Constitutional: [ ] negative [ ] fevers [ ] chills [ ] weight loss [ ] weight gain  HEENT: [ ] negative [ ] dry eyes [ ] eye irritation [ ] postnasal drip [ ] nasal congestion  CV: [ ] negative  [ ] chest pain [ ] orthopnea [ ] palpitations [ ] murmur  Resp: [ ] negative [ ] cough [ ] shortness of breath [ ] dyspnea [ ] wheezing [ ] sputum [ ] hemoptysis  GI: [ ] negative [ ] nausea [ ] vomiting [ ] diarrhea [ ] constipation [ ] abd pain [ ] dysphagia   : [ ] negative [ ] dysuria [ ] nocturia [ ] hematuria [ ] increased urinary frequency  Musculoskeletal: [ ] negative [ ] back pain [ ] myalgias [ ] arthralgias [ ] fracture  Skin: [ ] negative [ ] rash [ ] itch  Neurological: [ ] negative [ ] headache [ ] dizziness [ ] syncope [ ] weakness [ ] numbness  Psychiatric: [ ] negative [ ] anxiety [ ] depression  Endocrine: [ ] negative [ ] diabetes [ ] thyroid problem  Hematologic/Lymphatic: [ ] negative [ ] anemia [ ] bleeding problem  Allergic/Immunologic: [ ] negative [ ] itchy eyes [ ] nasal discharge [ ] hives [ ] angioedema  [ ] All other systems negative  [ ] Unable to assess ROS because ________    OBJECTIVE:  ICU Vital Signs Last 24 Hrs  T(C): 36.8 (23 Oct 2021 05:35), Max: 37.1 (22 Oct 2021 17:30)  T(F): 98.2 (23 Oct 2021 05:35), Max: 98.7 (22 Oct 2021 17:30)  HR: 71 (23 Oct 2021 05:35) (71 - 90)  BP: 120/64 (23 Oct 2021 05:35) (120/64 - 148/68)  BP(mean): --  ABP: --  ABP(mean): --  RR: 18 (23 Oct 2021 05:35) (16 - 18)  SpO2: 100% (23 Oct 2021 05:35) (96% - 100%)        10-21 @ 07:01  -  10-22 @ 07:00  --------------------------------------------------------  IN: 400 mL / OUT: 200 mL / NET: 200 mL    10-22 @ 07:01  -  10-23 @ 06:11  --------------------------------------------------------  IN: 600 mL / OUT: 1650 mL / NET: -1050 mL      CAPILLARY BLOOD GLUCOSE      POCT Blood Glucose.: 107 mg/dL (22 Oct 2021 00:53)      PHYSICAL EXAM:  General:   HEENT:   Lymph Nodes:  Neck:   Respiratory:   Cardiovascular:   Abdomen:   Extremities:   Skin:   Neurological:  Psychiatry:    LINES:     HOSPITAL MEDICATIONS:  Standing Meds:  cefTRIAXone   IVPB 1000 milliGRAM(s) IV Intermittent every 24 hours  furosemide   Injectable 20 milliGRAM(s) IV Push daily  influenza  Vaccine (HIGH DOSE) 0.7 milliLiter(s) IntraMuscular once  lisinopril 10 milliGRAM(s) Oral daily  metoprolol tartrate 12.5 milliGRAM(s) Oral two times a day  senna 2 Tablet(s) Oral at bedtime      PRN Meds:  acetaminophen     Tablet .. 650 milliGRAM(s) Oral every 6 hours PRN  melatonin 3 milliGRAM(s) Oral at bedtime PRN  polyethylene glycol 3350 17 Gram(s) Oral daily PRN      LABS:                        11.8   9.36  )-----------( 141      ( 22 Oct 2021 06:34 )             35.1     Hgb Trend: 11.8<--, 12.2<--  10-22    135  |  96<L>  |  19  ----------------------------<  90  3.7   |  27  |  0.47<L>    Ca    9.0      22 Oct 2021 06:34    TPro  7.2  /  Alb  4.3  /  TBili  2.6<H>  /  DBili  x   /  AST  41<H>  /  ALT  42<H>  /  AlkPhos  83  10-21    Creatinine Trend: 0.47<--, 0.43<--  PT/INR - ( 22 Oct 2021 06:34 )   PT: 25.2 sec;   INR: 2.28 ratio                   MICROBIOLOGY:       RADIOLOGY:  [ ] Reviewed and interpreted by me    PULMONARY FUNCTION TESTS:    EKG:   CHIEF COMPLAINT: SOB    HPI:  89yoF p/w SOB that started 2 days prior to admission suddenly when she woke up in the morning. PMH notable for a fib s/p cardioversion (on warfarin), HTN, asthma (? per pulmonology note however pt does not endorse this and is not on inhalers at home), severe pulmonary HTN (last tte 2018), psoriatic athritis, and chronic lower extremity edema.  Patient is admitted to the hospitalist service on 4L NC. Patient had a CTA which did now show PE, no pna, no ggo but with air trapping and enlarged PA. Followed with Dr. Renteria for pHTN but only had 1 visit in 2019. No PFT on file. Unclear WHO group possible II 2/2 to left sided disease w/ hx of MR, afib vs group 1 in setting of psoriatic arthritis.   Patient is currently getting lasix 20mg IV daily, on abx for pna? and pending TTE. and on 4L NC    Estimated right ventricular systolic pressure  equals 61 mm Hg, assuming right atrial pressure equals 10  mm Hg, consistent with severe pulmonary hypertension.      PAST MEDICAL & SURGICAL HISTORY:  Psoriasis    HTN    Atrial fibrillation    S/P colonoscopy with polypectomy    S/P cholecystectomy    S/P hysterectomy    Fracture of neck of right femur        FAMILY HISTORY:  No pertinent family history in first degree relatives        SOCIAL HISTORY:  Smoking: [ ] Never Smoked [ ] Former Smoker (__ packs x ___ years) [ ] Current Smoker  (__ packs x ___ years)  Substance Use: [ ] Never Used [ ] Used ____  EtOH Use:  Marital Status: [ ] Single [ ]  [ ]  [ ]   Sexual History:   Occupation:  Recent Travel:  Country of Birth:  Advance Directives:    Allergies    No Known Allergies    Intolerances        HOME MEDICATIONS:  Home Medications:  B Complex 50 oral tablet, extended release: 1 tab(s) orally once a day (21 Oct 2021 16:16)  Colace 100 mg oral capsule: 100 milligram(s) orally once a day, As Needed (21 Oct 2021 16:16)  etanercept 50 mg/mL subcutaneous solution: 50 milligram(s) subcutaneous once a week (21 Oct 2021 16:16)  folic acid 1 mg oral tablet: 1 tab(s) orally once a day (21 Oct 2021 16:16)  furosemide 40 mg oral tablet: 1 tab(s) orally once a day (21 Oct 2021 16:16)  methotrexate 5 mg oral tablet: 1 tab(s) orally once a week (21 Oct 2021 16:16)  metoprolol succinate 25 mg oral tablet, extended release: 1 tab(s) orally once a day (21 Oct 2021 16:16)  ramipril 2.5 mg oral capsule: 1 cap(s) orally once a day (21 Oct 2021 16:16)  Vitamin D2 50 mcg (2000 intl units) oral capsule: 1 cap(s) orally once a day (21 Oct 2021 16:16)  warfarin 5 mg oral tablet: 1 tab(s) orally once a day (21 Oct 2021 16:16)      REVIEW OF SYSTEMS:  Constitutional: [ ] negative [ ] fevers [ ] chills [ ] weight loss [ ] weight gain  HEENT: [ ] negative [ ] dry eyes [ ] eye irritation [ ] postnasal drip [ ] nasal congestion  CV: [ ] negative  [ ] chest pain [ ] orthopnea [ ] palpitations [ ] murmur  Resp: [ ] negative [ ] cough [ ] shortness of breath [ ] dyspnea [ ] wheezing [ ] sputum [ ] hemoptysis  GI: [ ] negative [ ] nausea [ ] vomiting [ ] diarrhea [ ] constipation [ ] abd pain [ ] dysphagia   : [ ] negative [ ] dysuria [ ] nocturia [ ] hematuria [ ] increased urinary frequency  Musculoskeletal: [ ] negative [ ] back pain [ ] myalgias [ ] arthralgias [ ] fracture  Skin: [ ] negative [ ] rash [ ] itch  Neurological: [ ] negative [ ] headache [ ] dizziness [ ] syncope [ ] weakness [ ] numbness  Psychiatric: [ ] negative [ ] anxiety [ ] depression  Endocrine: [ ] negative [ ] diabetes [ ] thyroid problem  Hematologic/Lymphatic: [ ] negative [ ] anemia [ ] bleeding problem  Allergic/Immunologic: [ ] negative [ ] itchy eyes [ ] nasal discharge [ ] hives [ ] angioedema  [ ] All other systems negative  [ ] Unable to assess ROS because ________    OBJECTIVE:  ICU Vital Signs Last 24 Hrs  T(C): 36.8 (23 Oct 2021 05:35), Max: 37.1 (22 Oct 2021 17:30)  T(F): 98.2 (23 Oct 2021 05:35), Max: 98.7 (22 Oct 2021 17:30)  HR: 71 (23 Oct 2021 05:35) (71 - 90)  BP: 120/64 (23 Oct 2021 05:35) (120/64 - 148/68)  BP(mean): --  ABP: --  ABP(mean): --  RR: 18 (23 Oct 2021 05:35) (16 - 18)  SpO2: 100% (23 Oct 2021 05:35) (96% - 100%)        10-21 @ 07:01  -  10-22 @ 07:00  --------------------------------------------------------  IN: 400 mL / OUT: 200 mL / NET: 200 mL    10-22 @ 07:01  -  10-23 @ 06:11  --------------------------------------------------------  IN: 600 mL / OUT: 1650 mL / NET: -1050 mL      CAPILLARY BLOOD GLUCOSE      POCT Blood Glucose.: 107 mg/dL (22 Oct 2021 00:53)      PHYSICAL EXAM:  General:   HEENT:   Lymph Nodes:  Neck:   Respiratory:   Cardiovascular:   Abdomen:   Extremities:   Skin:   Neurological:  Psychiatry:    LINES:     HOSPITAL MEDICATIONS:  Standing Meds:  cefTRIAXone   IVPB 1000 milliGRAM(s) IV Intermittent every 24 hours  furosemide   Injectable 20 milliGRAM(s) IV Push daily  influenza  Vaccine (HIGH DOSE) 0.7 milliLiter(s) IntraMuscular once  lisinopril 10 milliGRAM(s) Oral daily  metoprolol tartrate 12.5 milliGRAM(s) Oral two times a day  senna 2 Tablet(s) Oral at bedtime      PRN Meds:  acetaminophen     Tablet .. 650 milliGRAM(s) Oral every 6 hours PRN  melatonin 3 milliGRAM(s) Oral at bedtime PRN  polyethylene glycol 3350 17 Gram(s) Oral daily PRN      LABS:                        11.8   9.36  )-----------( 141      ( 22 Oct 2021 06:34 )             35.1     Hgb Trend: 11.8<--, 12.2<--  10-22    135  |  96<L>  |  19  ----------------------------<  90  3.7   |  27  |  0.47<L>    Ca    9.0      22 Oct 2021 06:34    TPro  7.2  /  Alb  4.3  /  TBili  2.6<H>  /  DBili  x   /  AST  41<H>  /  ALT  42<H>  /  AlkPhos  83  10-21    Creatinine Trend: 0.47<--, 0.43<--  PT/INR - ( 22 Oct 2021 06:34 )   PT: 25.2 sec;   INR: 2.28 ratio                   MICROBIOLOGY:       RADIOLOGY:  [ ] Reviewed and interpreted by me    PULMONARY FUNCTION TESTS:    EKG:   CHIEF COMPLAINT: SOB    HPI:  89yoF p/w SOB that started 2 days prior to admission suddenly when she woke up in the morning. PMH notable for a fib s/p cardioversion (on warfarin), HTN, asthma (? per pulmonology note however pt does not endorse this and is not on inhalers at home), severe pulmonary HTN (last tte 2018), psoriatic athritis, and chronic lower extremity edema. Patient has been having progressive SOB since last Tuesday has chronic LE edema but not the best historian. Has limited exercise capacity at baseline uses walker and does ADL. Even more HARPER with ADLS starting last Tuesday   Patient is admitted to the hospitalist service on 4L NC. Patient had a CTA which did now show PE, no pna, no ggo but with air trapping and enlarged PA. Followed with Dr. Renteria for pHTN but only had 1 visit in 2019. No PFT on file. Unclear WHO group possible II 2/2 to left sided disease w/ hx of MR, afib vs group 1 in setting of psoriatic arthritis.   Patient is currently getting lasix 20mg IV daily, on abx for pna? and pending TTE. and on 4L NC. Estimated right ventricular systolic pressure  equals 61 mm Hg, assuming right atrial pressure equals 10mm Hg, consistent with severe pulmonary hypertension.      PAST MEDICAL & SURGICAL HISTORY:  Psoriasis    HTN    Atrial fibrillation    S/P colonoscopy with polypectomy    S/P cholecystectomy    S/P hysterectomy    Fracture of neck of right femur        FAMILY HISTORY:  No pertinent family history in first degree relatives    SOCIAL HISTORY:  Social History (marital status, living situation, occupation, tobacco use, alcohol and drug use, and sexual history): Denies ETOH use, tobacco use, or drug use. States she smoked for about 10 years total, quit 40 years ago.    Allergies    No Known Allergies    Intolerances    HOME MEDICATIONS:  Home Medications:  B Complex 50 oral tablet, extended release: 1 tab(s) orally once a day (21 Oct 2021 16:16)  Colace 100 mg oral capsule: 100 milligram(s) orally once a day, As Needed (21 Oct 2021 16:16)  etanercept 50 mg/mL subcutaneous solution: 50 milligram(s) subcutaneous once a week (21 Oct 2021 16:16)  folic acid 1 mg oral tablet: 1 tab(s) orally once a day (21 Oct 2021 16:16)  furosemide 40 mg oral tablet: 1 tab(s) orally once a day (21 Oct 2021 16:16)  methotrexate 5 mg oral tablet: 1 tab(s) orally once a week (21 Oct 2021 16:16)  metoprolol succinate 25 mg oral tablet, extended release: 1 tab(s) orally once a day (21 Oct 2021 16:16)  ramipril 2.5 mg oral capsule: 1 cap(s) orally once a day (21 Oct 2021 16:16)  Vitamin D2 50 mcg (2000 intl units) oral capsule: 1 cap(s) orally once a day (21 Oct 2021 16:16)  warfarin 5 mg oral tablet: 1 tab(s) orally once a day (21 Oct 2021 16:16)      REVIEW OF SYSTEMS:  Constitutional: [x ] negative [ ] fevers [ ] chills [ ] weight loss [ ] weight gain  HEENT: [x ] negative [ ] dry eyes [ ] eye irritation [ ] postnasal drip [ ] nasal congestion  CV: [x ] negative  [ ] chest pain [ ] orthopnea [ ] palpitations [ ] murmur  Resp: [ ] negative [ ] cough [ ] shortness of breath [x] dyspnea [ ] wheezing [ ] sputum [ ] hemoptysis  GI: [x ] negative [ ] nausea [ ] vomiting [ ] diarrhea [ ] constipation [ ] abd pain [ ] dysphagia   : [ x] negative [ ] dysuria [ ] nocturia [ ] hematuria [ ] increased urinary frequency  Musculoskeletal: [x] negative [ ] back pain [ ] myalgias [ ] arthralgias [ ] fracture  Skin: [ x] negative [ ] rash [ ] itch  Neurological: [x ] negative [ ] headache [ ] dizziness [ ] syncope [ ] weakness [ ] numbness  Psychiatric: [x] negative [ ] anxiety [ ] depression  Endocrine: [x ] negative [ ] diabetes [ ] thyroid problem  Hematologic/Lymphatic: [ x] negative [ ] anemia [ ] bleeding problem  Allergic/Immunologic: [ x] negative [ ] itchy eyes [ ] nasal discharge [ ] hives [ ] angioedema  [ ] All other systems negative  [ ] Unable to assess ROS because ________    OBJECTIVE:  ICU Vital Signs Last 24 Hrs  T(C): 36.8 (23 Oct 2021 05:35), Max: 37.1 (22 Oct 2021 17:30)  T(F): 98.2 (23 Oct 2021 05:35), Max: 98.7 (22 Oct 2021 17:30)  HR: 71 (23 Oct 2021 05:35) (71 - 90)  BP: 120/64 (23 Oct 2021 05:35) (120/64 - 148/68)  BP(mean): --  ABP: --  ABP(mean): --  RR: 18 (23 Oct 2021 05:35) (16 - 18)  SpO2: 100% (23 Oct 2021 05:35) (96% - 100%)        10-21 @ 07:01  -  10-22 @ 07:00  --------------------------------------------------------  IN: 400 mL / OUT: 200 mL / NET: 200 mL    10-22 @ 07:01  -  10-23 @ 06:11  --------------------------------------------------------  IN: 600 mL / OUT: 1650 mL / NET: -1050 mL      CAPILLARY BLOOD GLUCOSE      POCT Blood Glucose.: 107 mg/dL (22 Oct 2021 00:53)    PHYSICAL EXAM:    Constitutional: well-developed; well-groomed; well-nourished; no distress,  Eyes: PERRL; EOMI  ENMT: Normal oropharnxy, no oral lesions, no erythema, no exudates  Neck:  Supple; no JVD;  Respiratory: airway patent; breath sounds equal; good air movement, no wheezing, no crackles, no rhonchi. no increase in WOB  Cardiovascular: regular rate and rhythm  no rub , no murmur, Loud S2  Gastrointestinal: soft; no distention, normal BS, no TTP, no organomegaly, no ascites.  Extremities: no clubbing; no cyanosis; trace pedal edema, non-tender to palpation, DP and Radial pulses intact.  Neurological: alert and oriented x 2;   Skin: warm and dry; color normal: no rash: no ulcers  Psychiatric: Calm, no SI/HI        LINES:     HOSPITAL MEDICATIONS:  Standing Meds:  cefTRIAXone   IVPB 1000 milliGRAM(s) IV Intermittent every 24 hours  furosemide   Injectable 20 milliGRAM(s) IV Push daily  influenza  Vaccine (HIGH DOSE) 0.7 milliLiter(s) IntraMuscular once  lisinopril 10 milliGRAM(s) Oral daily  metoprolol tartrate 12.5 milliGRAM(s) Oral two times a day  senna 2 Tablet(s) Oral at bedtime      PRN Meds:  acetaminophen     Tablet .. 650 milliGRAM(s) Oral every 6 hours PRN  melatonin 3 milliGRAM(s) Oral at bedtime PRN  polyethylene glycol 3350 17 Gram(s) Oral daily PRN      LABS:                        11.8   9.36  )-----------( 141      ( 22 Oct 2021 06:34 )             35.1     Hgb Trend: 11.8<--, 12.2<--  10-22    135  |  96<L>  |  19  ----------------------------<  90  3.7   |  27  |  0.47<L>    Ca    9.0      22 Oct 2021 06:34    TPro  7.2  /  Alb  4.3  /  TBili  2.6<H>  /  DBili  x   /  AST  41<H>  /  ALT  42<H>  /  AlkPhos  83  10-21    Creatinine Trend: 0.47<--, 0.43<--  PT/INR - ( 22 Oct 2021 06:34 )   PT: 25.2 sec;   INR: 2.28 ratio                   MICROBIOLOGY:       RADIOLOGY:  [ ] Reviewed and interpreted by me    PULMONARY FUNCTION TESTS:    EKG:   CHIEF COMPLAINT: SOB    HPI:  89yoF p/w SOB that started 2 days prior to admission suddenly when she woke up in the morning. PMH notable for a fib s/p cardioversion (on warfarin), HTN, asthma (? per pulmonology note however pt does not endorse this and is not on inhalers at home), severe pulmonary HTN (last tte 2018), psoriatic athritis, and chronic lower extremity edema. Patient has been having progressive SOB since last Tuesday has chronic LE edema but not the best historian. Has limited exercise capacity at baseline uses walker and does ADL. Even more HARPER with ADLS starting last Tuesday   Patient is admitted to the hospitalist service on 4L NC. Patient had a CTA which did now show PE, no pna, no ggo but with air trapping and enlarged PA. Followed with Dr. Renteria for pHTN but only had 1 visit in 2019. No PFT on file. Unclear WHO group possible II 2/2 to left sided disease w/ hx of MR, afib vs group 1 in setting of psoriatic arthritis.   Patient is currently getting lasix 20mg IV daily, on abx for pna? and pending TTE. and on 4L NC. Estimated right ventricular systolic pressure  equals 61 mm Hg, assuming right atrial pressure equals 10mm Hg, consistent with severe pulmonary hypertension.      PAST MEDICAL & SURGICAL HISTORY:  Psoriasis    HTN    Atrial fibrillation    S/P colonoscopy with polypectomy    S/P cholecystectomy    S/P hysterectomy    Fracture of neck of right femur        FAMILY HISTORY:  No pertinent family history in first degree relatives    SOCIAL HISTORY:  Social History (marital status, living situation, occupation, tobacco use, alcohol and drug use, and sexual history): Denies ETOH use, tobacco use, or drug use. States she smoked for about 10 years total, quit 40 years ago.    Allergies    No Known Allergies    Intolerances    HOME MEDICATIONS:  Home Medications:  B Complex 50 oral tablet, extended release: 1 tab(s) orally once a day (21 Oct 2021 16:16)  Colace 100 mg oral capsule: 100 milligram(s) orally once a day, As Needed (21 Oct 2021 16:16)  etanercept 50 mg/mL subcutaneous solution: 50 milligram(s) subcutaneous once a week (21 Oct 2021 16:16)  folic acid 1 mg oral tablet: 1 tab(s) orally once a day (21 Oct 2021 16:16)  furosemide 40 mg oral tablet: 1 tab(s) orally once a day (21 Oct 2021 16:16)  methotrexate 5 mg oral tablet: 1 tab(s) orally once a week (21 Oct 2021 16:16)  metoprolol succinate 25 mg oral tablet, extended release: 1 tab(s) orally once a day (21 Oct 2021 16:16)  ramipril 2.5 mg oral capsule: 1 cap(s) orally once a day (21 Oct 2021 16:16)  Vitamin D2 50 mcg (2000 intl units) oral capsule: 1 cap(s) orally once a day (21 Oct 2021 16:16)  warfarin 5 mg oral tablet: 1 tab(s) orally once a day (21 Oct 2021 16:16)      REVIEW OF SYSTEMS:  Constitutional: [x ] negative [ ] fevers [ ] chills [ ] weight loss [ ] weight gain  HEENT: [x ] negative [ ] dry eyes [ ] eye irritation [ ] postnasal drip [ ] nasal congestion  CV: [x ] negative  [ ] chest pain [ ] orthopnea [ ] palpitations [ ] murmur  Resp: [ ] negative [ ] cough [ ] shortness of breath [x] dyspnea [ ] wheezing [ ] sputum [ ] hemoptysis  GI: [x ] negative [ ] nausea [ ] vomiting [ ] diarrhea [ ] constipation [ ] abd pain [ ] dysphagia   : [ x] negative [ ] dysuria [ ] nocturia [ ] hematuria [ ] increased urinary frequency  Musculoskeletal: [x] negative [ ] back pain [ ] myalgias [ ] arthralgias [ ] fracture  Skin: [ x] negative [ ] rash [ ] itch  Neurological: [x ] negative [ ] headache [ ] dizziness [ ] syncope [ ] weakness [ ] numbness  Psychiatric: [x] negative [ ] anxiety [ ] depression  Endocrine: [x ] negative [ ] diabetes [ ] thyroid problem  Hematologic/Lymphatic: [ x] negative [ ] anemia [ ] bleeding problem  Allergic/Immunologic: [ x] negative [ ] itchy eyes [ ] nasal discharge [ ] hives [ ] angioedema  [ ] All other systems negative  [ ] Unable to assess ROS because ________    OBJECTIVE:  ICU Vital Signs Last 24 Hrs  T(C): 36.8 (23 Oct 2021 05:35), Max: 37.1 (22 Oct 2021 17:30)  T(F): 98.2 (23 Oct 2021 05:35), Max: 98.7 (22 Oct 2021 17:30)  HR: 71 (23 Oct 2021 05:35) (71 - 90)  BP: 120/64 (23 Oct 2021 05:35) (120/64 - 148/68)  BP(mean): --  ABP: --  ABP(mean): --  RR: 18 (23 Oct 2021 05:35) (16 - 18)  SpO2: 100% (23 Oct 2021 05:35) (96% - 100%)        10-21 @ 07:01  -  10-22 @ 07:00  --------------------------------------------------------  IN: 400 mL / OUT: 200 mL / NET: 200 mL    10-22 @ 07:01  -  10-23 @ 06:11  --------------------------------------------------------  IN: 600 mL / OUT: 1650 mL / NET: -1050 mL      CAPILLARY BLOOD GLUCOSE      POCT Blood Glucose.: 107 mg/dL (22 Oct 2021 00:53)    PHYSICAL EXAM:    Constitutional: well-developed; well-groomed; well-nourished; no distress,  Eyes: PERRL; EOMI  ENMT: Normal oropharnxy, no oral lesions, no erythema, no exudates  Neck:  Supple; no JVD;  Respiratory: airway patent; breath sounds equal; good air movement, no wheezing, no crackles, no rhonchi. no increase in WOB  Cardiovascular: regular rate and rhythm  no rub , no murmur, Loud S2  Gastrointestinal: soft; no distention, normal BS, no TTP, no organomegaly, no ascites.  Extremities: no clubbing; no cyanosis; trace pedal edema, non-tender to palpation, DP and Radial pulses intact.  Neurological: alert and oriented x 2;   Skin: warm and dry; color normal: no rash: no ulcers  Psychiatric: Calm, no SI/HI        LINES:     HOSPITAL MEDICATIONS:  Standing Meds:  cefTRIAXone   IVPB 1000 milliGRAM(s) IV Intermittent every 24 hours  furosemide   Injectable 20 milliGRAM(s) IV Push daily  influenza  Vaccine (HIGH DOSE) 0.7 milliLiter(s) IntraMuscular once  lisinopril 10 milliGRAM(s) Oral daily  metoprolol tartrate 12.5 milliGRAM(s) Oral two times a day  senna 2 Tablet(s) Oral at bedtime      PRN Meds:  acetaminophen     Tablet .. 650 milliGRAM(s) Oral every 6 hours PRN  melatonin 3 milliGRAM(s) Oral at bedtime PRN  polyethylene glycol 3350 17 Gram(s) Oral daily PRN      LABS:                        11.8   9.36  )-----------( 141      ( 22 Oct 2021 06:34 )             35.1     Hgb Trend: 11.8<--, 12.2<--  10-22    135  |  96<L>  |  19  ----------------------------<  90  3.7   |  27  |  0.47<L>    Ca    9.0      22 Oct 2021 06:34    TPro  7.2  /  Alb  4.3  /  TBili  2.6<H>  /  DBili  x   /  AST  41<H>  /  ALT  42<H>  /  AlkPhos  83  10-21    Creatinine Trend: 0.47<--, 0.43<--  PT/INR - ( 22 Oct 2021 06:34 )   PT: 25.2 sec;   INR: 2.28 ratio                   MICROBIOLOGY:       RADIOLOGY:  [x] Reviewed and interpreted by me  CTPA 10/21/21: No PE. Mosaic attenuation.      PULMONARY FUNCTION TESTS:    EKG:

## 2021-10-23 NOTE — CONSULT NOTE ADULT - ATTENDING COMMENTS
Agree with above. 89 F with known severe pulmonary hypertension presents with worsening shortness of breath. Improving with diuresis. Continue diuresis. Repeat echocardiogram. Agree with above. 89 F with known severe pulmonary hypertension presents with worsening shortness of breath and lower extremity swelling. Suspect acute heart failure exacerbation. Improving with diuresis. Continue diuresis. Repeat echocardiogram.

## 2021-10-23 NOTE — PROGRESS NOTE ADULT - PROBLEM SELECTOR PLAN 4
- Follow up Echocardiogram. - Follow up Echocardiogram.  outpatient cardio/pulmonology evaluation for continued care

## 2021-10-24 LAB
ANION GAP SERPL CALC-SCNC: 9 MMOL/L — SIGNIFICANT CHANGE UP (ref 7–14)
BASOPHILS # BLD AUTO: 0.01 K/UL — SIGNIFICANT CHANGE UP (ref 0–0.2)
BASOPHILS NFR BLD AUTO: 0.1 % — SIGNIFICANT CHANGE UP (ref 0–2)
BUN SERPL-MCNC: 24 MG/DL — HIGH (ref 7–23)
CALCIUM SERPL-MCNC: 9.7 MG/DL — SIGNIFICANT CHANGE UP (ref 8.4–10.5)
CHLORIDE SERPL-SCNC: 96 MMOL/L — LOW (ref 98–107)
CO2 SERPL-SCNC: 30 MMOL/L — SIGNIFICANT CHANGE UP (ref 22–31)
CREAT SERPL-MCNC: 0.51 MG/DL — SIGNIFICANT CHANGE UP (ref 0.5–1.3)
EOSINOPHIL # BLD AUTO: 0.1 K/UL — SIGNIFICANT CHANGE UP (ref 0–0.5)
EOSINOPHIL NFR BLD AUTO: 1.3 % — SIGNIFICANT CHANGE UP (ref 0–6)
GLUCOSE SERPL-MCNC: 103 MG/DL — HIGH (ref 70–99)
HCT VFR BLD CALC: 36.7 % — SIGNIFICANT CHANGE UP (ref 34.5–45)
HGB BLD-MCNC: 11.9 G/DL — SIGNIFICANT CHANGE UP (ref 11.5–15.5)
IANC: 5.83 K/UL — SIGNIFICANT CHANGE UP (ref 1.5–8.5)
IMM GRANULOCYTES NFR BLD AUTO: 0.4 % — SIGNIFICANT CHANGE UP (ref 0–1.5)
INR BLD: 2.42 RATIO — HIGH (ref 0.88–1.16)
LYMPHOCYTES # BLD AUTO: 0.85 K/UL — LOW (ref 1–3.3)
LYMPHOCYTES # BLD AUTO: 10.8 % — LOW (ref 13–44)
MAGNESIUM SERPL-MCNC: 1.8 MG/DL — SIGNIFICANT CHANGE UP (ref 1.6–2.6)
MCHC RBC-ENTMCNC: 32.4 GM/DL — SIGNIFICANT CHANGE UP (ref 32–36)
MCHC RBC-ENTMCNC: 33.4 PG — SIGNIFICANT CHANGE UP (ref 27–34)
MCV RBC AUTO: 103.1 FL — HIGH (ref 80–100)
MONOCYTES # BLD AUTO: 1.04 K/UL — HIGH (ref 0–0.9)
MONOCYTES NFR BLD AUTO: 13.2 % — SIGNIFICANT CHANGE UP (ref 2–14)
NEUTROPHILS # BLD AUTO: 5.83 K/UL — SIGNIFICANT CHANGE UP (ref 1.8–7.4)
NEUTROPHILS NFR BLD AUTO: 74.2 % — SIGNIFICANT CHANGE UP (ref 43–77)
NRBC # BLD: 0 /100 WBCS — SIGNIFICANT CHANGE UP
NRBC # FLD: 0 K/UL — SIGNIFICANT CHANGE UP
PHOSPHATE SERPL-MCNC: 3 MG/DL — SIGNIFICANT CHANGE UP (ref 2.5–4.5)
PLATELET # BLD AUTO: 158 K/UL — SIGNIFICANT CHANGE UP (ref 150–400)
POTASSIUM SERPL-MCNC: 4.5 MMOL/L — SIGNIFICANT CHANGE UP (ref 3.5–5.3)
POTASSIUM SERPL-SCNC: 4.5 MMOL/L — SIGNIFICANT CHANGE UP (ref 3.5–5.3)
PROTHROM AB SERPL-ACNC: 26.4 SEC — HIGH (ref 10.6–13.6)
RBC # BLD: 3.56 M/UL — LOW (ref 3.8–5.2)
RBC # FLD: 17.2 % — HIGH (ref 10.3–14.5)
SODIUM SERPL-SCNC: 135 MMOL/L — SIGNIFICANT CHANGE UP (ref 135–145)
WBC # BLD: 7.86 K/UL — SIGNIFICANT CHANGE UP (ref 3.8–10.5)
WBC # FLD AUTO: 7.86 K/UL — SIGNIFICANT CHANGE UP (ref 3.8–10.5)

## 2021-10-24 PROCEDURE — 99232 SBSQ HOSP IP/OBS MODERATE 35: CPT

## 2021-10-24 PROCEDURE — 93306 TTE W/DOPPLER COMPLETE: CPT | Mod: 26

## 2021-10-24 RX ORDER — WARFARIN SODIUM 2.5 MG/1
5 TABLET ORAL ONCE
Refills: 0 | Status: COMPLETED | OUTPATIENT
Start: 2021-10-24 | End: 2021-10-24

## 2021-10-24 RX ADMIN — LISINOPRIL 10 MILLIGRAM(S): 2.5 TABLET ORAL at 06:40

## 2021-10-24 RX ADMIN — SENNA PLUS 2 TABLET(S): 8.6 TABLET ORAL at 22:26

## 2021-10-24 RX ADMIN — Medication 12.5 MILLIGRAM(S): at 18:40

## 2021-10-24 RX ADMIN — WARFARIN SODIUM 5 MILLIGRAM(S): 2.5 TABLET ORAL at 18:40

## 2021-10-24 RX ADMIN — Medication 3 MILLILITER(S): at 16:14

## 2021-10-24 RX ADMIN — Medication 3 MILLILITER(S): at 22:09

## 2021-10-24 RX ADMIN — Medication 650 MILLIGRAM(S): at 06:40

## 2021-10-24 RX ADMIN — Medication 20 MILLIGRAM(S): at 18:39

## 2021-10-24 RX ADMIN — Medication 650 MILLIGRAM(S): at 16:10

## 2021-10-24 RX ADMIN — Medication 650 MILLIGRAM(S): at 17:10

## 2021-10-24 RX ADMIN — Medication 3 MILLILITER(S): at 03:53

## 2021-10-24 RX ADMIN — Medication 12.5 MILLIGRAM(S): at 06:40

## 2021-10-24 RX ADMIN — POLYETHYLENE GLYCOL 3350 17 GRAM(S): 17 POWDER, FOR SOLUTION ORAL at 10:58

## 2021-10-24 RX ADMIN — Medication 3 MILLILITER(S): at 10:30

## 2021-10-24 NOTE — PROGRESS NOTE ADULT - PROBLEM SELECTOR PLAN 1
Potential precipitants include myocardial ischemia, arrhythmia, restrictive lung disease , Pulm htn, valvular dysfunction, PE,  diet/med nonadherence.  - Daily standing weights, strict I/Os, telemetry  - Low Na diet w fluid restriction to 1500 cc daily  - Oxygen to maintain spo2 >92%, titrate as needed.   - Continue IV Lasix 20mg daily, tolerating well.   - Toprol XL 25 mg daily-> Metop tartrate 12.5 mg BID while hospitalized  - Ramipril 2.5 mg daily switched to Lisinopril 10 mg daily.   - FOLLOW UP ECHOcardiogram transthoracic  - Daily BMP, Mg replete lytes.  wean to room air , re assess oxygen requirement during ambulation  on discharge if indicated for pulm htn and core pulmonale.

## 2021-10-24 NOTE — PROGRESS NOTE ADULT - ASSESSMENT
A 89yoF PMH notable for a fib s/p cardioversion (on Warfarin), HTN, asthma. ? per pulmonology note however pt does not endorse this and is not on inhalers at home), severe pulmonary HTN (last tte 2018), psoriatic athritis (on MTX and etanercept), and chronic lower extremity edema p/w SOB that started 2 days prior to admission admitted for presumed HFpEF exacerbation with IV diuresis given elevated proBNP on admission as well as possible CAP with Ceftriaxone in the setting of leukocytosis and ongoing cough. symptoms attributed to pulmonary htn. A 89yoF PMH notable for a fib s/p cardioversion (on Warfarin), HTN, asthma. ? per pulmonology note however pt does not endorse this and is not on inhalers at home), severe pulmonary HTN (last tte 2018), psoriatic athritis (on MTX and etanercept), and chronic lower extremity edema p/w SOB that started 2 days prior to admission admitted for presumed HFpEF exacerbation with IV diuresis given elevated proBNP on admission as well as possible CAP  treated initially with IV Ceftriaxone in the setting of leukocytosis and ongoing cough. symptoms attributed to pulmonary htn and acute on chronic diastolic chf

## 2021-10-24 NOTE — PROGRESS NOTE ADULT - SUBJECTIVE AND OBJECTIVE BOX
PROGRESS NOTE:     Patient is a 89y old  Female who presents with a chief complaint of SOB (23 Oct 2021 18:52)      SUBJECTIVE / OVERNIGHT EVENTS:  she denie cough or SOB  ADDITIONAL REVIEW OF SYSTEMS:  denies fever    MEDICATIONS  (STANDING):  albuterol/ipratropium for Nebulization 3 milliLiter(s) Nebulizer every 6 hours  furosemide   Injectable 20 milliGRAM(s) IV Push daily  influenza  Vaccine (HIGH DOSE) 0.7 milliLiter(s) IntraMuscular once  lisinopril 10 milliGRAM(s) Oral daily  metoprolol tartrate 12.5 milliGRAM(s) Oral two times a day  senna 2 Tablet(s) Oral at bedtime    MEDICATIONS  (PRN):  acetaminophen     Tablet .. 650 milliGRAM(s) Oral every 6 hours PRN Temp greater or equal to 38C (100.4F), Mild Pain (1 - 3)  melatonin 3 milliGRAM(s) Oral at bedtime PRN Insomnia  polyethylene glycol 3350 17 Gram(s) Oral daily PRN Constipation      CAPILLARY BLOOD GLUCOSE        I&O's Summary    23 Oct 2021 07:01  -  24 Oct 2021 07:00  --------------------------------------------------------  IN: 500 mL / OUT: 1200 mL / NET: -700 mL    24 Oct 2021 07:01  -  24 Oct 2021 14:30  --------------------------------------------------------  IN: 75 mL / OUT: 150 mL / NET: -75 mL        PHYSICAL EXAM:  Vital Signs Last 24 Hrs  T(C): 36.6 (24 Oct 2021 10:10), Max: 37.1 (23 Oct 2021 18:10)  T(F): 97.8 (24 Oct 2021 10:10), Max: 98.8 (23 Oct 2021 18:10)  HR: 92 (24 Oct 2021 10:10) (80 - 92)  BP: 119/52 (24 Oct 2021 10:10) (119/52 - 136/62)  BP(mean): --  RR: 16 (24 Oct 2021 10:10) (16 - 18)  SpO2: 94% (24 Oct 2021 10:10) (94% - 100%)    CONSTITUTIONAL: NAD, well-developed  alert awake, orientedx2  RESPIRATORY: Normal respiratory effort; lungs are diminished bilateral, faint rales bibasilar   CARDIOVASCULAR: Regular rate and rhythm, normal S1 and S2, no murmur/rub/gallop; No lower extremity edema; Peripheral pulses are 2+ bilaterally  ABDOMEN: Nontender to palpation,     LABS:                        11.9   7.86  )-----------( 158      ( 24 Oct 2021 06:13 )             36.7     10-24    135  |  96<L>  |  24<H>  ----------------------------<  103<H>  4.5   |  30  |  0.51    Ca    9.7      24 Oct 2021 06:13  Phos  3.0     10-24  Mg     1.80     10-24      PT/INR - ( 24 Oct 2021 06:13 )   PT: 26.4 sec;   INR: 2.42 ratio        RADIOLOGY & ADDITIONAL TESTS:  Results Reviewed: y  Imaging Personally Reviewed:y  Electrocardiogram Personally Reviewed:y    COORDINATION OF CARE:  Care Discussed with Consultants/Other Providers [Y/N]:y  Prior or Outpatient Records Reviewed [Y/N]:fawn

## 2021-10-25 ENCOUNTER — TRANSCRIPTION ENCOUNTER (OUTPATIENT)
Age: 86
End: 2021-10-25

## 2021-10-25 LAB
ANION GAP SERPL CALC-SCNC: 7 MMOL/L — SIGNIFICANT CHANGE UP (ref 7–14)
BASOPHILS # BLD AUTO: 0.02 K/UL — SIGNIFICANT CHANGE UP (ref 0–0.2)
BASOPHILS NFR BLD AUTO: 0.3 % — SIGNIFICANT CHANGE UP (ref 0–2)
BUN SERPL-MCNC: 21 MG/DL — SIGNIFICANT CHANGE UP (ref 7–23)
CALCIUM SERPL-MCNC: 9.2 MG/DL — SIGNIFICANT CHANGE UP (ref 8.4–10.5)
CHLORIDE SERPL-SCNC: 94 MMOL/L — LOW (ref 98–107)
CO2 SERPL-SCNC: 33 MMOL/L — HIGH (ref 22–31)
CREAT SERPL-MCNC: 0.46 MG/DL — LOW (ref 0.5–1.3)
EOSINOPHIL # BLD AUTO: 0.12 K/UL — SIGNIFICANT CHANGE UP (ref 0–0.5)
EOSINOPHIL NFR BLD AUTO: 1.6 % — SIGNIFICANT CHANGE UP (ref 0–6)
GLUCOSE SERPL-MCNC: 96 MG/DL — SIGNIFICANT CHANGE UP (ref 70–99)
HCT VFR BLD CALC: 35.4 % — SIGNIFICANT CHANGE UP (ref 34.5–45)
HGB BLD-MCNC: 11.5 G/DL — SIGNIFICANT CHANGE UP (ref 11.5–15.5)
IANC: 5.26 K/UL — SIGNIFICANT CHANGE UP (ref 1.5–8.5)
IMM GRANULOCYTES NFR BLD AUTO: 0.5 % — SIGNIFICANT CHANGE UP (ref 0–1.5)
INR BLD: 2.18 RATIO — HIGH (ref 0.88–1.16)
LYMPHOCYTES # BLD AUTO: 1.12 K/UL — SIGNIFICANT CHANGE UP (ref 1–3.3)
LYMPHOCYTES # BLD AUTO: 15.1 % — SIGNIFICANT CHANGE UP (ref 13–44)
MAGNESIUM SERPL-MCNC: 1.8 MG/DL — SIGNIFICANT CHANGE UP (ref 1.6–2.6)
MCHC RBC-ENTMCNC: 32.5 GM/DL — SIGNIFICANT CHANGE UP (ref 32–36)
MCHC RBC-ENTMCNC: 33.3 PG — SIGNIFICANT CHANGE UP (ref 27–34)
MCV RBC AUTO: 102.6 FL — HIGH (ref 80–100)
MONOCYTES # BLD AUTO: 0.88 K/UL — SIGNIFICANT CHANGE UP (ref 0–0.9)
MONOCYTES NFR BLD AUTO: 11.8 % — SIGNIFICANT CHANGE UP (ref 2–14)
NEUTROPHILS # BLD AUTO: 5.26 K/UL — SIGNIFICANT CHANGE UP (ref 1.8–7.4)
NEUTROPHILS NFR BLD AUTO: 70.7 % — SIGNIFICANT CHANGE UP (ref 43–77)
NRBC # BLD: 0 /100 WBCS — SIGNIFICANT CHANGE UP
NRBC # FLD: 0 K/UL — SIGNIFICANT CHANGE UP
PHOSPHATE SERPL-MCNC: 2.5 MG/DL — SIGNIFICANT CHANGE UP (ref 2.5–4.5)
PLATELET # BLD AUTO: 170 K/UL — SIGNIFICANT CHANGE UP (ref 150–400)
POTASSIUM SERPL-MCNC: 4.5 MMOL/L — SIGNIFICANT CHANGE UP (ref 3.5–5.3)
POTASSIUM SERPL-SCNC: 4.5 MMOL/L — SIGNIFICANT CHANGE UP (ref 3.5–5.3)
PROTHROM AB SERPL-ACNC: 24.2 SEC — HIGH (ref 10.6–13.6)
RBC # BLD: 3.45 M/UL — LOW (ref 3.8–5.2)
RBC # FLD: 17.2 % — HIGH (ref 10.3–14.5)
SODIUM SERPL-SCNC: 134 MMOL/L — LOW (ref 135–145)
WBC # BLD: 7.44 K/UL — SIGNIFICANT CHANGE UP (ref 3.8–10.5)
WBC # FLD AUTO: 7.44 K/UL — SIGNIFICANT CHANGE UP (ref 3.8–10.5)

## 2021-10-25 PROCEDURE — 99233 SBSQ HOSP IP/OBS HIGH 50: CPT | Mod: GC

## 2021-10-25 PROCEDURE — 99232 SBSQ HOSP IP/OBS MODERATE 35: CPT

## 2021-10-25 RX ORDER — METOPROLOL TARTRATE 50 MG
0.5 TABLET ORAL
Qty: 30 | Refills: 0
Start: 2021-10-25 | End: 2021-11-23

## 2021-10-25 RX ORDER — FUROSEMIDE 40 MG
40 TABLET ORAL DAILY
Refills: 0 | Status: DISCONTINUED | OUTPATIENT
Start: 2021-10-26 | End: 2021-10-28

## 2021-10-25 RX ORDER — FUROSEMIDE 40 MG
20 TABLET ORAL
Refills: 0 | Status: DISCONTINUED | OUTPATIENT
Start: 2021-10-25 | End: 2021-10-25

## 2021-10-25 RX ORDER — METOPROLOL TARTRATE 50 MG
1 TABLET ORAL
Qty: 0 | Refills: 0 | DISCHARGE

## 2021-10-25 RX ORDER — METOPROLOL TARTRATE 50 MG
1 TABLET ORAL
Qty: 30 | Refills: 0
Start: 2021-10-25 | End: 2021-11-23

## 2021-10-25 RX ORDER — WARFARIN SODIUM 2.5 MG/1
5 TABLET ORAL ONCE
Refills: 0 | Status: COMPLETED | OUTPATIENT
Start: 2021-10-25 | End: 2021-10-25

## 2021-10-25 RX ADMIN — WARFARIN SODIUM 5 MILLIGRAM(S): 2.5 TABLET ORAL at 18:22

## 2021-10-25 RX ADMIN — Medication 3 MILLILITER(S): at 04:09

## 2021-10-25 RX ADMIN — Medication 650 MILLIGRAM(S): at 01:06

## 2021-10-25 RX ADMIN — Medication 3 MILLILITER(S): at 09:37

## 2021-10-25 RX ADMIN — Medication 650 MILLIGRAM(S): at 14:26

## 2021-10-25 RX ADMIN — Medication 650 MILLIGRAM(S): at 00:12

## 2021-10-25 RX ADMIN — Medication 3 MILLILITER(S): at 16:23

## 2021-10-25 RX ADMIN — Medication 12.5 MILLIGRAM(S): at 06:15

## 2021-10-25 RX ADMIN — SENNA PLUS 2 TABLET(S): 8.6 TABLET ORAL at 22:22

## 2021-10-25 RX ADMIN — Medication 3 MILLILITER(S): at 21:54

## 2021-10-25 RX ADMIN — Medication 10 MILLIGRAM(S): at 14:26

## 2021-10-25 RX ADMIN — LISINOPRIL 10 MILLIGRAM(S): 2.5 TABLET ORAL at 06:15

## 2021-10-25 RX ADMIN — Medication 12.5 MILLIGRAM(S): at 18:23

## 2021-10-25 RX ADMIN — POLYETHYLENE GLYCOL 3350 17 GRAM(S): 17 POWDER, FOR SOLUTION ORAL at 06:16

## 2021-10-25 NOTE — DISCHARGE NOTE PROVIDER - NSDCFUSCHEDAPPT_GEN_ALL_CORE_FT
RYLIE DEMPSEY ; 11/30/2021 ; [facility] [id number] RYLIE DEMPSEY ; 11/02/2021 ; [facility] [id number]

## 2021-10-25 NOTE — PROGRESS NOTE ADULT - ATTENDING COMMENTS
89F with pulmonary hypertension with worsening right heart failure.  Likely Group II but complete work up was not performed. May also have Group I given arthritis (psoriatic vs rheumatoid?) also with mosaic attenuation of lung parenchyma but no significant obstruction noted on prior PFT. Patient currently symptomatically improved with diuresis and given that patient would likely not be a candidate for further vasodilator therapy, do not recommend further inpatient work up at this time. Continue diuresis, follow up as outpatient with Dr. Renteria

## 2021-10-25 NOTE — PROGRESS NOTE ADULT - ASSESSMENT
89yoF p/w SOB that started 2 days prior to admission suddenly when she woke up in the morning. PMH notable for a fib s/p cardioversion (on warfarin), HTN, asthma (? per pulmonology note however pt does not endorse this and is not on inhalers at home), severe pulmonary HTN (last tte 2018), psoriatic athritis, mitral regurgitation and chronic lower extremity edema presenting with worsening HARPER, LE edema.     # Severe pHTN, unclear group suspect 1 vs 2  # ADHF  # LE edema  - Hx of severe pHTN, seen by Dr. Renteria in clinic x1, PFTs with nonspecific pattern and decreased DLCO. CTA without pna or fluid, does have mosaic attenuation and enlarged PA.  - Improvement likely in the setting of diuresis  - TTE reviewed, moderate pHTN with decreased RV systolic function.  - Agree with diuresis given and poor function not likely a candidate for advance therapies for pHTN.   - Wean O2 as tolerated  - OOB to chair, miladys PRN, incentive ismael  - PT eval   - Can follow up with pulmonary as an outpatient. Please email: [email or web address] to setup an appointment prior to discharge. Include the patient's name, , MRN and contact information in the email.      Pulmonary[facility]  [address] [place] [address]  [phone]    Demario Feldman, -[id number]  Pulmonary and Critical Care Medicine  [id number]

## 2021-10-25 NOTE — DISCHARGE NOTE PROVIDER - NSDCFUADDAPPT_GEN_ALL_CORE_FT
Appointment with Dr. Bronwyn Edwards on November 30th at 11:40AM at [facility] in the cardiology offices located on the 4th floor in the oncology building, [phone]  Appointment with Dr. Bronwyn Edwards on November 30th at 8:00AM at [facility] in the cardiology offices located on the 4th floor in the oncology building, [phone]

## 2021-10-25 NOTE — PROGRESS NOTE ADULT - SUBJECTIVE AND OBJECTIVE BOX
Patient is a 89y old  Female who presents with a chief complaint of Shortness of breath  (25 Oct 2021 10:56)      SUBJECTIVE / OVERNIGHT EVENTS: Pt is generally feeling very weak with poor activity tolerance. Denies chest pain or dyspnea at rest.    MEDICATIONS  (STANDING):  albuterol/ipratropium for Nebulization 3 milliLiter(s) Nebulizer every 6 hours  furosemide   Injectable 20 milliGRAM(s) IV Push daily  influenza  Vaccine (HIGH DOSE) 0.7 milliLiter(s) IntraMuscular once  lisinopril 10 milliGRAM(s) Oral daily  metoprolol tartrate 12.5 milliGRAM(s) Oral two times a day  senna 2 Tablet(s) Oral at bedtime  warfarin 5 milliGRAM(s) Oral once    MEDICATIONS  (PRN):  acetaminophen     Tablet .. 650 milliGRAM(s) Oral every 6 hours PRN Temp greater or equal to 38C (100.4F), Mild Pain (1 - 3)  melatonin 3 milliGRAM(s) Oral at bedtime PRN Insomnia  polyethylene glycol 3350 17 Gram(s) Oral daily PRN Constipation      CAPILLARY BLOOD GLUCOSE        I&O's Summary    24 Oct 2021 07:01  -  25 Oct 2021 07:00  --------------------------------------------------------  IN: 945 mL / OUT: 1050 mL / NET: -105 mL        PHYSICAL EXAM:  Vital Signs Last 24 Hrs  T(C): 36.6 (25 Oct 2021 09:04), Max: 36.7 (25 Oct 2021 06:14)  T(F): 97.9 (25 Oct 2021 09:04), Max: 98.1 (25 Oct 2021 06:14)  HR: 78 (25 Oct 2021 09:44) (76 - 94)  BP: 150/68 (25 Oct 2021 09:04) (126/48 - 150/68)  BP(mean): --  RR: 18 (25 Oct 2021 09:04) (16 - 18)  SpO2: 95% (25 Oct 2021 09:44) (95% - 99%)  CONSTITUTIONAL: thin older woman, appears tired  EYES: PERRLA; conjunctiva and sclera clear  ENMT: Moist oral mucosa, no pharyngeal injection or exudates; normal dentition  RESPIRATORY: Normal respiratory effort; lungs are clear to auscultation bilaterally  CARDIOVASCULAR: Regular rate and rhythm, normal S1 and S2, no murmur/rub/gallop; No lower extremity edema; Peripheral pulses are 2+ bilaterally  ABDOMEN: Nontender to palpation, normoactive bowel sounds, no rebound/guarding; No hepatosplenomegaly    LABS:                        11.5   7.44  )-----------( 170      ( 25 Oct 2021 08:15 )             35.4     10-25    134<L>  |  94<L>  |  21  ----------------------------<  96  4.5   |  33<H>  |  0.46<L>    Ca    9.2      25 Oct 2021 08:15  Phos  2.5     10-25  Mg     1.80     10-25      PT/INR - ( 25 Oct 2021 08:15 )   PT: 24.2 sec;   INR: 2.18 ratio                     RADIOLOGY & ADDITIONAL TESTS:  Results Reviewed:   Imaging Personally Reviewed:  Electrocardiogram Personally Reviewed:    COORDINATION OF CARE:  Care Discussed with Consultants/Other Providers [Y/N]:  Prior or Outpatient Records Reviewed [Y/N]:

## 2021-10-25 NOTE — PROGRESS NOTE ADULT - PROBLEM SELECTOR PLAN 1
Potential precipitants include myocardial ischemia, arrhythmia, restrictive lung disease , Pulm htn, valvular dysfunction, PE,  diet/med nonadherence.  - Daily standing weights, strict I/Os, telemetry  - Low Na diet w fluid restriction to 1500 cc daily  - Oxygen to maintain spo2 >92%, titrate as needed.   - Continue IV Lasix 20mg daily, tolerating well.   - Toprol XL 25 mg daily-> Metop tartrate 12.5 mg BID while hospitalized  - Ramipril 2.5 mg daily switched to Lisinopril 10 mg daily.   - TTE with worsening RV dilatation and decreased function  - Daily BMP, Mg replete lytes.  wean to room air , re assess oxygen requirement during ambulation  on discharge if indicated for pulm htn and core pulmonale.

## 2021-10-25 NOTE — DISCHARGE NOTE PROVIDER - NSDCMRMEDTOKEN_GEN_ALL_CORE_FT
B Complex 50 oral tablet, extended release: 1 tab(s) orally once a day  Colace 100 mg oral capsule: 100 milligram(s) orally once a day, As Needed  etanercept 50 mg/mL subcutaneous solution: 50 milligram(s) subcutaneous once a week  folic acid 1 mg oral tablet: 1 tab(s) orally once a day  furosemide 40 mg oral tablet: 1 tab(s) orally once a day  methotrexate 5 mg oral tablet: 1 tab(s) orally once a week  metoprolol tartrate 25 mg oral tablet: 0.5 tab(s) orally 2 times a day   ramipril 2.5 mg oral capsule: 1 cap(s) orally once a day  Vitamin D2 50 mcg (2000 intl units) oral capsule: 1 cap(s) orally once a day  warfarin 5 mg oral tablet: 1 tab(s) orally once a day   B Complex 50 oral tablet, extended release: 1 tab(s) orally once a day  Colace 100 mg oral capsule: 100 milligram(s) orally once a day, As Needed  etanercept 50 mg/mL subcutaneous solution: 50 milligram(s) subcutaneous once a week  folic acid 1 mg oral tablet: 1 tab(s) orally once a day  furosemide 40 mg oral tablet: 1 tab(s) orally once a day  methotrexate 5 mg oral tablet: 1 tab(s) orally once a week  metoprolol succinate 25 mg oral capsule, extended release: 1 cap(s) orally once a day   ramipril 2.5 mg oral capsule: 1 cap(s) orally once a day  Vitamin D2 50 mcg (2000 intl units) oral capsule: 1 cap(s) orally once a day  warfarin 5 mg oral tablet: 1 tab(s) orally once a day   B Complex 50 oral tablet, extended release: 1 tab(s) orally once a day  Colace 100 mg oral capsule: 100 milligram(s) orally once a day, As Needed  etanercept 50 mg/mL subcutaneous solution: 50 milligram(s) subcutaneous once a week  folic acid 1 mg oral tablet: 1 tab(s) orally once a day  furosemide 40 mg oral tablet: 1 tab(s) orally once a day  methotrexate 5 mg oral tablet: 1 tab(s) orally once a week  metoprolol succinate 25 mg oral capsule, extended release: 1 cap(s) orally once a day   ramipril 2.5 mg oral capsule: 1 cap(s) orally once a day  Vitamin D2 50 mcg (2000 intl units) oral capsule: 1 cap(s) orally once a day  warfarin 6 mg oral tablet: 1 tab(s) orally once a day

## 2021-10-25 NOTE — DISCHARGE NOTE PROVIDER - NSDCCPCAREPLAN_GEN_ALL_CORE_FT
PRINCIPAL DISCHARGE DIAGNOSIS  Diagnosis: Acute exacerbation of CHF (congestive heart failure)  Assessment and Plan of Treatment: You were treated for a heart failure exacerbation with lasix (water pill) through the IV and have improved. Please take all medications as prescribed and monitor you weight daily. Follow up with your cardiologist at your scheduled appointment time.      SECONDARY DISCHARGE DIAGNOSES  Diagnosis: CAP (community acquired pneumonia)  Assessment and Plan of Treatment: You were treated for pneumonia with antibiotics and you do not require further antibiotics at this time. Please contact your physician if ou develop worsening shortness of breath or a fever.    Diagnosis: HTN (hypertension)  Assessment and Plan of Treatment: Continue blood pressure medications as prescribed. Routine follow up with your PCP for blood pressure checks and medication management. A low salt diet is recommended.    Diagnosis: Chronic atrial fibrillation  Assessment and Plan of Treatment: Continue your metoprolol as prescribed for heart rate control and your coumadin for anticoagulation (blood thinner). Continue to follow up with your primary care physician or cardiologist for INR checks when on coumadin.

## 2021-10-25 NOTE — DISCHARGE NOTE PROVIDER - HOSPITAL COURSE
89 y/o F PMH notable for a fib s/p cardioversion (on Warfarin), HTN, asthma. ? per pulmonology note however pt does not endorse this and is not on inhalers at home), severe pulmonary HTN (last tte 2018), psoriatic athritis (on MTX and etanercept), and chronic lower extremity edema p/w SOB that started 2 days prior to admission. Admitted for CHF exacerbation and was diuresed until euvolemic. TTE with worsening RV function as compared to prior echo. Per cardiology, no further inpatient work up. Treated for CAP with Ceftriaxone.        89 y/o F PMH notable for a fib s/p cardioversion (on Warfarin), HTN, asthma. ? per pulmonology note however pt does not endorse this and is not on inhalers at home), severe pulmonary HTN (last tte 2018), psoriatic athritis (on MTX and etanercept), and chronic lower extremity edema p/w SOB that started 2 days prior to admission. Admitted for CHF exacerbation and was diuresed until euvolemic. TTE with worsening RV function as compared to prior echo. Per cardiology, no further inpatient work up. Patient is to continue PO lasix 40mg daily. Patient was treated for pneumonia with ceftriaxone. Patient's O2 saturation is currently stable on room air. Patient is medically stable for discharge on ---------- per attending  -----------.         89 y/o F PMH notable for a fib s/p cardioversion (on Warfarin), HTN, asthma. ? per pulmonology note however pt does not endorse this and is not on inhalers at home), severe pulmonary HTN (last tte 2018), psoriatic athritis (on MTX and etanercept), and chronic lower extremity edema p/w SOB that started 2 days prior to admission. Admitted for CHF exacerbation and was diuresed until euvolemic. TTE with worsening RV function as compared to prior echo. Per cardiology, no further inpatient work up. Patient is to continue PO lasix 40mg daily. Patient was treated for pneumonia with ceftriaxone. Patient's O2 saturation is currently stable on room air. Patient is medically stable for discharge on 10/28/2021 per attending Dr. Vital.

## 2021-10-25 NOTE — PROGRESS NOTE ADULT - ASSESSMENT
A 89yoF PMH notable for a fib s/p cardioversion (on Warfarin), HTN, asthma. ? per pulmonology note however pt does not endorse this and is not on inhalers at home), severe pulmonary HTN (last tte 2018), psoriatic athritis (on MTX and etanercept), and chronic lower extremity edema p/w SOB that started 2 days prior to admission admitted for presumed HFpEF exacerbation with IV diuresis given elevated proBNP on admission as well as possible CAP  treated initially with IV Ceftriaxone in the setting of leukocytosis and ongoing cough. symptoms attributed to pulmonary htn and acute on chronic diastolic chf

## 2021-10-25 NOTE — PROGRESS NOTE ADULT - SUBJECTIVE AND OBJECTIVE BOX
CHIEF COMPLAINT: SOB    Interval Events: No acute events. Patient reports that she is breathing comfortably, however she does endorse some R hip pain due to her positioning. She denies cough.    REVIEW OF SYSTEMS:  Constitutional: No fevers or chills. No weight loss.   Eyes: No itching or discharge from the eyes  ENT: No ear pain. No ear discharge. No nasal congestion.   CV: No chest pain. No palpitations. No lightheadedness or dizziness.   Resp: No dyspnea at rest. No cough.  GI: No nausea. No vomiting. No diarrhea.  MSK: No joint pain or pain in any extremities  Integumentary: No skin lesions. No pedal edema.  Neurological: No gross motor weakness. No sensory changes.  [x] All other systems negative  [ ] Unable to assess ROS because ________    OBJECTIVE:  ICU Vital Signs Last 24 Hrs  T(C): 36.6 (25 Oct 2021 09:04), Max: 36.7 (25 Oct 2021 06:14)  T(F): 97.9 (25 Oct 2021 09:04), Max: 98.1 (25 Oct 2021 06:14)  HR: 78 (25 Oct 2021 09:44) (76 - 94)  BP: 150/68 (25 Oct 2021 09:04) (119/52 - 150/68)  BP(mean): --  ABP: --  ABP(mean): --  RR: 18 (25 Oct 2021 09:04) (16 - 18)  SpO2: 95% (25 Oct 2021 09:44) (94% - 99%)        10-24 @ 07:01  -  10-25 @ 07:00  --------------------------------------------------------  IN: 945 mL / OUT: 1050 mL / NET: -105 mL      CAPILLARY BLOOD GLUCOSE    PHYSICAL EXAM:  General: Awake, alert, oriented X 3.   HEENT: Atraumatic, normocephalic.   Lymph Nodes: No palpable lymphadenopathy  Neck: No JVD. No carotid bruit.   Respiratory: Normal chest expansion. Clear to auscultation.  Cardiovascular: S1 S2 normal. No murmurs, rubs or gallops.   Abdomen: Soft, non-tender, non-distended. No organomegaly.  Extremities: Warm to touch. Peripheral pulse palpable. No pedal edema.   Skin: No rashes or skin lesions  Neurological: Motor and sensory examination equal and normal in all four extremities.  Psychiatry: Appropriate mood and affect.    HOSPITAL MEDICATIONS:  MEDICATIONS  (STANDING):  albuterol/ipratropium for Nebulization 3 milliLiter(s) Nebulizer every 6 hours  furosemide   Injectable 20 milliGRAM(s) IV Push daily  influenza  Vaccine (HIGH DOSE) 0.7 milliLiter(s) IntraMuscular once  lisinopril 10 milliGRAM(s) Oral daily  metoprolol tartrate 12.5 milliGRAM(s) Oral two times a day  senna 2 Tablet(s) Oral at bedtime  warfarin 5 milliGRAM(s) Oral once    MEDICATIONS  (PRN):  acetaminophen     Tablet .. 650 milliGRAM(s) Oral every 6 hours PRN Temp greater or equal to 38C (100.4F), Mild Pain (1 - 3)  melatonin 3 milliGRAM(s) Oral at bedtime PRN Insomnia  polyethylene glycol 3350 17 Gram(s) Oral daily PRN Constipation      LABS:                        11.5   7.44  )-----------( 170      ( 25 Oct 2021 08:15 )             35.4     10-25    134<L>  |  94<L>  |  21  ----------------------------<  96  4.5   |  33<H>  |  0.46<L>    Ca    9.2      25 Oct 2021 08:15  Phos  2.5     10-25  Mg     1.80     10-25      PT/INR - ( 25 Oct 2021 08:15 )   PT: 24.2 sec;   INR: 2.18 ratio                   MICROBIOLOGY:     RADIOLOGY:  [ ] Reviewed and interpreted by me    Point of Care Ultrasound Findings:    PFT:    EKG:

## 2021-10-25 NOTE — CHART NOTE - NSCHARTNOTEFT_GEN_A_CORE
TTE with worsening RV function compared to TTE from 2019. Discussed with cardiology fellow who recommends outpatient follow up with established care at the cardiology clinic and no further inpatient workup.     Scheduled earliest appointment with Dr. Bronwyn Edwards on November 30th at 11:40AM.

## 2021-10-26 LAB
ANION GAP SERPL CALC-SCNC: 9 MMOL/L — SIGNIFICANT CHANGE UP (ref 7–14)
BUN SERPL-MCNC: 20 MG/DL — SIGNIFICANT CHANGE UP (ref 7–23)
CALCIUM SERPL-MCNC: 9.5 MG/DL — SIGNIFICANT CHANGE UP (ref 8.4–10.5)
CHLORIDE SERPL-SCNC: 92 MMOL/L — LOW (ref 98–107)
CO2 SERPL-SCNC: 30 MMOL/L — SIGNIFICANT CHANGE UP (ref 22–31)
CREAT SERPL-MCNC: 0.45 MG/DL — LOW (ref 0.5–1.3)
GLUCOSE SERPL-MCNC: 108 MG/DL — HIGH (ref 70–99)
HCT VFR BLD CALC: 35.8 % — SIGNIFICANT CHANGE UP (ref 34.5–45)
HGB BLD-MCNC: 12.1 G/DL — SIGNIFICANT CHANGE UP (ref 11.5–15.5)
INR BLD: 2.01 RATIO — HIGH (ref 0.88–1.16)
MAGNESIUM SERPL-MCNC: 1.9 MG/DL — SIGNIFICANT CHANGE UP (ref 1.6–2.6)
MCHC RBC-ENTMCNC: 33.3 PG — SIGNIFICANT CHANGE UP (ref 27–34)
MCHC RBC-ENTMCNC: 33.8 GM/DL — SIGNIFICANT CHANGE UP (ref 32–36)
MCV RBC AUTO: 98.6 FL — SIGNIFICANT CHANGE UP (ref 80–100)
NRBC # BLD: 0 /100 WBCS — SIGNIFICANT CHANGE UP
NRBC # FLD: 0 K/UL — SIGNIFICANT CHANGE UP
PHOSPHATE SERPL-MCNC: 2.5 MG/DL — SIGNIFICANT CHANGE UP (ref 2.5–4.5)
PLATELET # BLD AUTO: 178 K/UL — SIGNIFICANT CHANGE UP (ref 150–400)
POTASSIUM SERPL-MCNC: 4.5 MMOL/L — SIGNIFICANT CHANGE UP (ref 3.5–5.3)
POTASSIUM SERPL-SCNC: 4.5 MMOL/L — SIGNIFICANT CHANGE UP (ref 3.5–5.3)
PROTHROM AB SERPL-ACNC: 22.2 SEC — HIGH (ref 10.6–13.6)
RBC # BLD: 3.63 M/UL — LOW (ref 3.8–5.2)
RBC # FLD: 17.1 % — HIGH (ref 10.3–14.5)
SODIUM SERPL-SCNC: 131 MMOL/L — LOW (ref 135–145)
WBC # BLD: 8.09 K/UL — SIGNIFICANT CHANGE UP (ref 3.8–10.5)
WBC # FLD AUTO: 8.09 K/UL — SIGNIFICANT CHANGE UP (ref 3.8–10.5)

## 2021-10-26 PROCEDURE — 99232 SBSQ HOSP IP/OBS MODERATE 35: CPT

## 2021-10-26 RX ORDER — FUROSEMIDE 40 MG
40 TABLET ORAL ONCE
Refills: 0 | Status: COMPLETED | OUTPATIENT
Start: 2021-10-26 | End: 2021-10-26

## 2021-10-26 RX ORDER — WARFARIN SODIUM 2.5 MG/1
7 TABLET ORAL ONCE
Refills: 0 | Status: COMPLETED | OUTPATIENT
Start: 2021-10-26 | End: 2021-10-26

## 2021-10-26 RX ADMIN — Medication 3 MILLILITER(S): at 13:41

## 2021-10-26 RX ADMIN — Medication 40 MILLIGRAM(S): at 05:59

## 2021-10-26 RX ADMIN — Medication 1 ENEMA: at 10:37

## 2021-10-26 RX ADMIN — LISINOPRIL 10 MILLIGRAM(S): 2.5 TABLET ORAL at 05:59

## 2021-10-26 RX ADMIN — Medication 3 MILLILITER(S): at 22:08

## 2021-10-26 RX ADMIN — Medication 12.5 MILLIGRAM(S): at 17:33

## 2021-10-26 RX ADMIN — Medication 12.5 MILLIGRAM(S): at 05:59

## 2021-10-26 RX ADMIN — Medication 40 MILLIGRAM(S): at 10:37

## 2021-10-26 RX ADMIN — SENNA PLUS 2 TABLET(S): 8.6 TABLET ORAL at 21:44

## 2021-10-26 RX ADMIN — WARFARIN SODIUM 7 MILLIGRAM(S): 2.5 TABLET ORAL at 17:38

## 2021-10-26 RX ADMIN — Medication 650 MILLIGRAM(S): at 18:54

## 2021-10-26 NOTE — PROVIDER CONTACT NOTE (OTHER) - BACKGROUND
pt admitted with SOB
Pt admitting dx is SOB. Pt has PMHx of Afib, HTN, and CHF.
Patient admitted for Shortness of breath. Hx of Atrial Fibrillation, HTN, and Psoriasis
Patient admitted for Shortness of breath. Hx of Atrial Fibrillation, HTN, and Psoriasis

## 2021-10-26 NOTE — PROVIDER CONTACT NOTE (OTHER) - ASSESSMENT
Pt weaned to RA during the day but sating at 89% currently. Put 1L NC and sustaining 96%. Patient denies SOB or trouble breathing when on room air.
Patient is AxOx4. All VS stable. Patient denies any chest pain and. Patient dyspneic on exertion
BP elevated, pt asymptomatic
Patient is AxOx4. All VS stable. Patient denies any chest pain and or SOB. Patient asymptomatic at time of tele event

## 2021-10-26 NOTE — PROVIDER CONTACT NOTE (OTHER) - SITUATION
O2 Saturation upon ambulation:  SpO2 at rest on room air 92%  SpO2 upon ambulation/exertion on room air is 89%  SpO2 upon ambulation/exertion on 1.5L NC is 92%
Pt weaned to RA during the day but sating at 89% currently. Put 1L NC and sustaining 96%.
Patient had 5 beats of Vtach on tele
pt /85

## 2021-10-26 NOTE — PHARMACOTHERAPY INTERVENTION NOTE - COMMENTS
Patient and daughter counseled on medication indications, administration, and side effects. Also discussed fluid and salt restrictions, and maintaining a log of daily weights. Patient verbalized understanding.

## 2021-10-26 NOTE — PROVIDER CONTACT NOTE (OTHER) - RECOMMENDATIONS
BP medication
PA Themichael Troy made aware of findings
PRIYANK Troy made aware, awaiting recommendations
Waiting for PA response

## 2021-10-26 NOTE — PROVIDER CONTACT NOTE (OTHER) - REASON
Patient had 5 beats of Vtach on tele
hypertension
O2 Saturation upon ambulation on room air is 89%
O2 saturation below parameter

## 2021-10-26 NOTE — PROGRESS NOTE ADULT - SUBJECTIVE AND OBJECTIVE BOX
Patient is a 89y old  Female who presents with a chief complaint of SOB (25 Oct 2021 12:07)      SUBJECTIVE / OVERNIGHT EVENTS: Pt feels a bit better. Breathing is easier, upper abd/lower chest discomfort is decreased. She is having bowel movements now s/p enema, which has also be a source of relief.    MEDICATIONS  (STANDING):  albuterol/ipratropium for Nebulization 3 milliLiter(s) Nebulizer every 6 hours  furosemide    Tablet 40 milliGRAM(s) Oral daily  influenza  Vaccine (HIGH DOSE) 0.7 milliLiter(s) IntraMuscular once  lisinopril 10 milliGRAM(s) Oral daily  metoprolol tartrate 12.5 milliGRAM(s) Oral two times a day  senna 2 Tablet(s) Oral at bedtime  warfarin 7 milliGRAM(s) Oral once    MEDICATIONS  (PRN):  acetaminophen     Tablet .. 650 milliGRAM(s) Oral every 6 hours PRN Temp greater or equal to 38C (100.4F), Mild Pain (1 - 3)  melatonin 3 milliGRAM(s) Oral at bedtime PRN Insomnia  polyethylene glycol 3350 17 Gram(s) Oral daily PRN Constipation      CAPILLARY BLOOD GLUCOSE        I&O's Summary    25 Oct 2021 07:01  -  26 Oct 2021 07:00  --------------------------------------------------------  IN: 690 mL / OUT: 1050 mL / NET: -360 mL        PHYSICAL EXAM:  Vital Signs Last 24 Hrs  T(C): 36.7 (26 Oct 2021 11:35), Max: 36.7 (26 Oct 2021 11:35)  T(F): 98 (26 Oct 2021 11:35), Max: 98 (26 Oct 2021 11:35)  HR: 82 (26 Oct 2021 11:35) (67 - 102)  BP: 135/71 (26 Oct 2021 11:35) (133/58 - 178/96)  BP(mean): --  RR: 20 (26 Oct 2021 11:35) (16 - 20)  SpO2: 96% (26 Oct 2021 11:35) (92% - 96%)  CONSTITUTIONAL: thin older woman, NAD  EYES: PERRLA; conjunctiva and sclera clear  ENMT: Moist oral mucosa, no pharyngeal injection or exudates; normal dentition  RESPIRATORY: Normal respiratory effort; lungs are clear to auscultation bilaterally  CARDIOVASCULAR: Regular rate and rhythm, normal S1 and S2, no murmur/rub/gallop; No lower extremity edema; Peripheral pulses are 2+ bilaterally  ABDOMEN: Nontender to palpation, normoactive bowel sounds, no rebound/guarding; No hepatosplenomegaly      LABS:                        12.1   8.09  )-----------( 178      ( 26 Oct 2021 07:04 )             35.8     10-26    131<L>  |  92<L>  |  20  ----------------------------<  108<H>  4.5   |  30  |  0.45<L>    Ca    9.5      26 Oct 2021 07:04  Phos  2.5     10-26  Mg     1.90     10-26      PT/INR - ( 26 Oct 2021 07:04 )   PT: 22.2 sec;   INR: 2.01 ratio                     RADIOLOGY & ADDITIONAL TESTS:  Results Reviewed:   Imaging Personally Reviewed:  Electrocardiogram Personally Reviewed:    COORDINATION OF CARE:  Care Discussed with Consultants/Other Providers [Y/N]:  Prior or Outpatient Records Reviewed [Y/N]:

## 2021-10-27 LAB
ANION GAP SERPL CALC-SCNC: 12 MMOL/L — SIGNIFICANT CHANGE UP (ref 7–14)
BASOPHILS # BLD AUTO: 0.02 K/UL — SIGNIFICANT CHANGE UP (ref 0–0.2)
BASOPHILS NFR BLD AUTO: 0.3 % — SIGNIFICANT CHANGE UP (ref 0–2)
BUN SERPL-MCNC: 25 MG/DL — HIGH (ref 7–23)
CALCIUM SERPL-MCNC: 9.8 MG/DL — SIGNIFICANT CHANGE UP (ref 8.4–10.5)
CHLORIDE SERPL-SCNC: 90 MMOL/L — LOW (ref 98–107)
CO2 SERPL-SCNC: 30 MMOL/L — SIGNIFICANT CHANGE UP (ref 22–31)
CREAT SERPL-MCNC: 0.49 MG/DL — LOW (ref 0.5–1.3)
EOSINOPHIL # BLD AUTO: 0.07 K/UL — SIGNIFICANT CHANGE UP (ref 0–0.5)
EOSINOPHIL NFR BLD AUTO: 0.9 % — SIGNIFICANT CHANGE UP (ref 0–6)
GLUCOSE SERPL-MCNC: 106 MG/DL — HIGH (ref 70–99)
HCT VFR BLD CALC: 36.5 % — SIGNIFICANT CHANGE UP (ref 34.5–45)
HGB BLD-MCNC: 12.1 G/DL — SIGNIFICANT CHANGE UP (ref 11.5–15.5)
IANC: 5.41 K/UL — SIGNIFICANT CHANGE UP (ref 1.5–8.5)
IMM GRANULOCYTES NFR BLD AUTO: 0.5 % — SIGNIFICANT CHANGE UP (ref 0–1.5)
INR BLD: 1.96 RATIO — HIGH (ref 0.88–1.16)
LYMPHOCYTES # BLD AUTO: 1.55 K/UL — SIGNIFICANT CHANGE UP (ref 1–3.3)
LYMPHOCYTES # BLD AUTO: 19.5 % — SIGNIFICANT CHANGE UP (ref 13–44)
MAGNESIUM SERPL-MCNC: 1.9 MG/DL — SIGNIFICANT CHANGE UP (ref 1.6–2.6)
MCHC RBC-ENTMCNC: 32.4 PG — SIGNIFICANT CHANGE UP (ref 27–34)
MCHC RBC-ENTMCNC: 33.2 GM/DL — SIGNIFICANT CHANGE UP (ref 32–36)
MCV RBC AUTO: 97.6 FL — SIGNIFICANT CHANGE UP (ref 80–100)
MONOCYTES # BLD AUTO: 0.86 K/UL — SIGNIFICANT CHANGE UP (ref 0–0.9)
MONOCYTES NFR BLD AUTO: 10.8 % — SIGNIFICANT CHANGE UP (ref 2–14)
NEUTROPHILS # BLD AUTO: 5.41 K/UL — SIGNIFICANT CHANGE UP (ref 1.8–7.4)
NEUTROPHILS NFR BLD AUTO: 68 % — SIGNIFICANT CHANGE UP (ref 43–77)
NRBC # BLD: 0 /100 WBCS — SIGNIFICANT CHANGE UP
NRBC # FLD: 0 K/UL — SIGNIFICANT CHANGE UP
PHOSPHATE SERPL-MCNC: 2.7 MG/DL — SIGNIFICANT CHANGE UP (ref 2.5–4.5)
PLATELET # BLD AUTO: 190 K/UL — SIGNIFICANT CHANGE UP (ref 150–400)
POTASSIUM SERPL-MCNC: 4 MMOL/L — SIGNIFICANT CHANGE UP (ref 3.5–5.3)
POTASSIUM SERPL-SCNC: 4 MMOL/L — SIGNIFICANT CHANGE UP (ref 3.5–5.3)
PROTHROM AB SERPL-ACNC: 21.8 SEC — HIGH (ref 10.6–13.6)
RBC # BLD: 3.74 M/UL — LOW (ref 3.8–5.2)
RBC # FLD: 17.2 % — HIGH (ref 10.3–14.5)
SARS-COV-2 RNA SPEC QL NAA+PROBE: SIGNIFICANT CHANGE UP
SODIUM SERPL-SCNC: 132 MMOL/L — LOW (ref 135–145)
WBC # BLD: 7.95 K/UL — SIGNIFICANT CHANGE UP (ref 3.8–10.5)
WBC # FLD AUTO: 7.95 K/UL — SIGNIFICANT CHANGE UP (ref 3.8–10.5)

## 2021-10-27 PROCEDURE — 99232 SBSQ HOSP IP/OBS MODERATE 35: CPT

## 2021-10-27 RX ORDER — WARFARIN SODIUM 2.5 MG/1
6 TABLET ORAL ONCE
Refills: 0 | Status: COMPLETED | OUTPATIENT
Start: 2021-10-27 | End: 2021-10-27

## 2021-10-27 RX ADMIN — LISINOPRIL 10 MILLIGRAM(S): 2.5 TABLET ORAL at 05:52

## 2021-10-27 RX ADMIN — Medication 10 MILLIGRAM(S): at 18:19

## 2021-10-27 RX ADMIN — POLYETHYLENE GLYCOL 3350 17 GRAM(S): 17 POWDER, FOR SOLUTION ORAL at 11:50

## 2021-10-27 RX ADMIN — Medication 3 MILLILITER(S): at 15:09

## 2021-10-27 RX ADMIN — Medication 650 MILLIGRAM(S): at 21:22

## 2021-10-27 RX ADMIN — Medication 40 MILLIGRAM(S): at 05:53

## 2021-10-27 RX ADMIN — Medication 3 MILLILITER(S): at 10:42

## 2021-10-27 RX ADMIN — WARFARIN SODIUM 6 MILLIGRAM(S): 2.5 TABLET ORAL at 17:49

## 2021-10-27 RX ADMIN — Medication 650 MILLIGRAM(S): at 13:27

## 2021-10-27 RX ADMIN — SENNA PLUS 2 TABLET(S): 8.6 TABLET ORAL at 21:22

## 2021-10-27 RX ADMIN — Medication 650 MILLIGRAM(S): at 12:39

## 2021-10-27 RX ADMIN — Medication 650 MILLIGRAM(S): at 22:00

## 2021-10-27 RX ADMIN — Medication 12.5 MILLIGRAM(S): at 05:53

## 2021-10-27 NOTE — PROGRESS NOTE ADULT - PROBLEM SELECTOR PLAN 1
Potential precipitants include myocardial ischemia, arrhythmia, restrictive lung disease , Pulm htn, valvular dysfunction, PE,  diet/med nonadherence.  - Daily standing weights, strict I/Os, telemetry  - Low Na diet w fluid restriction to 1500 cc daily  - Oxygen to maintain spo2 >92%, titrate as needed.   - Continue to titrate lasix  - Toprol XL 25 mg daily-> Metop tartrate 12.5 mg BID while hospitalized  - Ramipril 2.5 mg daily switched to Lisinopril 10 mg daily.   - TTE with worsening RV dilatation and decreased function  - Daily BMP, Mg replete lytes.  wean to room air , re assess oxygen requirement during ambulation  on discharge if indicated for pulm htn and core pulmonale.

## 2021-10-27 NOTE — PROGRESS NOTE ADULT - SUBJECTIVE AND OBJECTIVE BOX
Patient is a 89y old  Female who presents with a chief complaint of SOB (26 Oct 2021 11:54)      SUBJECTIVE / OVERNIGHT EVENTS: Pt feels a little improved, but overall, still quite weak and physically limited.    MEDICATIONS  (STANDING):  albuterol/ipratropium for Nebulization 3 milliLiter(s) Nebulizer every 6 hours  furosemide    Tablet 40 milliGRAM(s) Oral daily  influenza  Vaccine (HIGH DOSE) 0.7 milliLiter(s) IntraMuscular once  lisinopril 10 milliGRAM(s) Oral daily  metoprolol tartrate 12.5 milliGRAM(s) Oral two times a day  senna 2 Tablet(s) Oral at bedtime  warfarin 6 milliGRAM(s) Oral once    MEDICATIONS  (PRN):  acetaminophen     Tablet .. 650 milliGRAM(s) Oral every 6 hours PRN Temp greater or equal to 38C (100.4F), Mild Pain (1 - 3)  melatonin 3 milliGRAM(s) Oral at bedtime PRN Insomnia  polyethylene glycol 3350 17 Gram(s) Oral daily PRN Constipation      CAPILLARY BLOOD GLUCOSE        I&O's Summary    26 Oct 2021 07:01  -  27 Oct 2021 07:00  --------------------------------------------------------  IN: 830 mL / OUT: 3000 mL / NET: -2170 mL    27 Oct 2021 07:01  -  27 Oct 2021 12:05  --------------------------------------------------------  IN: 0 mL / OUT: 800 mL / NET: -800 mL        PHYSICAL EXAM:  Vital Signs Last 24 Hrs  T(C): 36.8 (27 Oct 2021 05:45), Max: 36.8 (27 Oct 2021 05:45)  T(F): 98.3 (27 Oct 2021 05:45), Max: 98.3 (27 Oct 2021 05:45)  HR: 81 (27 Oct 2021 05:45) (63 - 92)  BP: 150/79 (27 Oct 2021 05:45) (125/67 - 150/79)  BP(mean): --  RR: 18 (27 Oct 2021 05:45) (18 - 18)  SpO2: 97% (27 Oct 2021 05:45) (96% - 97%)  CONSTITUTIONAL: thin older woman, some mild respiratory distress at rest  EYES: PERRLA; conjunctiva and sclera clear  ENMT: Moist oral mucosa, no pharyngeal injection or exudates; normal dentition  RESPIRATORY: Normal respiratory effort; lungs are clear to auscultation bilaterally  CARDIOVASCULAR: Regular rate and rhythm, normal S1 and S2, no murmur/rub/gallop; No lower extremity edema; Peripheral pulses are 2+ bilaterally  ABDOMEN: Nontender to palpation, normoactive bowel sounds, no rebound/guarding; No hepatosplenomegaly    LABS:                        12.1   7.95  )-----------( 190      ( 27 Oct 2021 07:41 )             36.5     10-27    132<L>  |  90<L>  |  25<H>  ----------------------------<  106<H>  4.0   |  30  |  0.49<L>    Ca    9.8      27 Oct 2021 07:41  Phos  2.7     10-27  Mg     1.90     10-27      PT/INR - ( 27 Oct 2021 09:40 )   PT: 21.8 sec;   INR: 1.96 ratio                     RADIOLOGY & ADDITIONAL TESTS:  Results Reviewed:   Imaging Personally Reviewed:  Electrocardiogram Personally Reviewed:    COORDINATION OF CARE:  Care Discussed with Consultants/Other Providers [Y/N]:  Prior or Outpatient Records Reviewed [Y/N]:

## 2021-10-28 ENCOUNTER — TRANSCRIPTION ENCOUNTER (OUTPATIENT)
Age: 86
End: 2021-10-28

## 2021-10-28 VITALS
OXYGEN SATURATION: 99 % | SYSTOLIC BLOOD PRESSURE: 128 MMHG | DIASTOLIC BLOOD PRESSURE: 60 MMHG | TEMPERATURE: 98 F | RESPIRATION RATE: 18 BRPM | HEART RATE: 81 BPM

## 2021-10-28 LAB
ANION GAP SERPL CALC-SCNC: 12 MMOL/L — SIGNIFICANT CHANGE UP (ref 7–14)
BUN SERPL-MCNC: 34 MG/DL — HIGH (ref 7–23)
CALCIUM SERPL-MCNC: 10 MG/DL — SIGNIFICANT CHANGE UP (ref 8.4–10.5)
CHLORIDE SERPL-SCNC: 86 MMOL/L — LOW (ref 98–107)
CO2 SERPL-SCNC: 29 MMOL/L — SIGNIFICANT CHANGE UP (ref 22–31)
CREAT SERPL-MCNC: 0.59 MG/DL — SIGNIFICANT CHANGE UP (ref 0.5–1.3)
GLUCOSE SERPL-MCNC: 101 MG/DL — HIGH (ref 70–99)
HCT VFR BLD CALC: 34.5 % — SIGNIFICANT CHANGE UP (ref 34.5–45)
HGB BLD-MCNC: 11.9 G/DL — SIGNIFICANT CHANGE UP (ref 11.5–15.5)
INR BLD: 2.35 RATIO — HIGH (ref 0.88–1.16)
MAGNESIUM SERPL-MCNC: 2 MG/DL — SIGNIFICANT CHANGE UP (ref 1.6–2.6)
MCHC RBC-ENTMCNC: 33.1 PG — SIGNIFICANT CHANGE UP (ref 27–34)
MCHC RBC-ENTMCNC: 34.5 GM/DL — SIGNIFICANT CHANGE UP (ref 32–36)
MCV RBC AUTO: 95.8 FL — SIGNIFICANT CHANGE UP (ref 80–100)
NRBC # BLD: 0 /100 WBCS — SIGNIFICANT CHANGE UP
NRBC # FLD: 0 K/UL — SIGNIFICANT CHANGE UP
PHOSPHATE SERPL-MCNC: 3.1 MG/DL — SIGNIFICANT CHANGE UP (ref 2.5–4.5)
PLATELET # BLD AUTO: 195 K/UL — SIGNIFICANT CHANGE UP (ref 150–400)
POTASSIUM SERPL-MCNC: 4.4 MMOL/L — SIGNIFICANT CHANGE UP (ref 3.5–5.3)
POTASSIUM SERPL-SCNC: 4.4 MMOL/L — SIGNIFICANT CHANGE UP (ref 3.5–5.3)
PROTHROM AB SERPL-ACNC: 25.9 SEC — HIGH (ref 10.6–13.6)
RBC # BLD: 3.6 M/UL — LOW (ref 3.8–5.2)
RBC # FLD: 17 % — HIGH (ref 10.3–14.5)
SODIUM SERPL-SCNC: 127 MMOL/L — LOW (ref 135–145)
WBC # BLD: 9.69 K/UL — SIGNIFICANT CHANGE UP (ref 3.8–10.5)
WBC # FLD AUTO: 9.69 K/UL — SIGNIFICANT CHANGE UP (ref 3.8–10.5)

## 2021-10-28 PROCEDURE — 99239 HOSP IP/OBS DSCHRG MGMT >30: CPT

## 2021-10-28 RX ORDER — WARFARIN SODIUM 2.5 MG/1
1 TABLET ORAL
Qty: 0 | Refills: 0 | DISCHARGE

## 2021-10-28 RX ORDER — WARFARIN SODIUM 2.5 MG/1
1 TABLET ORAL
Qty: 0 | Refills: 0 | DISCHARGE
Start: 2021-10-28

## 2021-10-28 RX ORDER — WARFARIN SODIUM 2.5 MG/1
6 TABLET ORAL ONCE
Refills: 0 | Status: DISCONTINUED | OUTPATIENT
Start: 2021-10-28 | End: 2021-10-28

## 2021-10-28 RX ADMIN — Medication 3 MILLILITER(S): at 10:15

## 2021-10-28 RX ADMIN — Medication 40 MILLIGRAM(S): at 05:53

## 2021-10-28 RX ADMIN — LISINOPRIL 10 MILLIGRAM(S): 2.5 TABLET ORAL at 05:54

## 2021-10-28 RX ADMIN — Medication 12.5 MILLIGRAM(S): at 05:54

## 2021-10-28 NOTE — PROGRESS NOTE ADULT - PROBLEM SELECTOR PLAN 2
- possible cAP, Leukocytosis on admission. RVP and COVID19 negative.   - Ceftriaxone 1g daily x3 days. No Azithro given neg RVP for atypicals  - O2 weaned at rest, may still need with activity
- Leukocytosis on admission. RVP and COVID19 negative.   - Ceftriaxone 1g daily x3 days. No Azithro given neg RVP for atypicals  - Continue to wean off O2 as tolerated.
- possible cAP, Leukocytosis on admission. RVP and COVID19 negative.   - Ceftriaxone 1g daily x3 days. No Azithro given neg RVP for atypicals  - Continue to wean off O2 as tolerated.
- possible cAP, Leukocytosis on admission. RVP and COVID19 negative.   - Ceftriaxone 1g daily x3 days. No Azithro given neg RVP for atypicals  - O2 weaned at rest, may still need with activity
- possible cAP, Leukocytosis on admission. RVP and COVID19 negative.   - Ceftriaxone 1g daily x3 days. No Azithro given neg RVP for atypicals  - Continue to wean off O2 as tolerated.

## 2021-10-28 NOTE — PROGRESS NOTE ADULT - PROBLEM SELECTOR PLAN 6
DVT ppx: Continue SCDs. Already on AC  Dispo: PT eval and treat. SW/CM involvement.   Possibly [facility] candidate versus Home, request CM for HHA.
DVT ppx: Continue SCDs. Already on AC  Plan d/c to [facility] today
DVT ppx: Continue SCDs. Already on AC  Dispo: PT eval and treat. SW/CM involvement.   Possibly [facility] candidate versus Home, request CM for HHA.

## 2021-10-28 NOTE — PROGRESS NOTE ADULT - PROBLEM SELECTOR PROBLEM 6
DVT prophylaxis
- - -
DVT prophylaxis

## 2021-10-28 NOTE — PROGRESS NOTE ADULT - PROBLEM SELECTOR PROBLEM 7
R/O Primary pulmonary hypertension

## 2021-10-28 NOTE — PROGRESS NOTE ADULT - SUBJECTIVE AND OBJECTIVE BOX
Patient is a 89y old  Female who presents with a chief complaint of SOB (27 Oct 2021 12:05)      SUBJECTIVE / OVERNIGHT EVENTS: Pt still feels weak, but breathing improved. Trying to eat more.    MEDICATIONS  (STANDING):  albuterol/ipratropium for Nebulization 3 milliLiter(s) Nebulizer every 6 hours  furosemide    Tablet 40 milliGRAM(s) Oral daily  influenza  Vaccine (HIGH DOSE) 0.7 milliLiter(s) IntraMuscular once  lisinopril 10 milliGRAM(s) Oral daily  metoprolol tartrate 12.5 milliGRAM(s) Oral two times a day  senna 2 Tablet(s) Oral at bedtime  warfarin 6 milliGRAM(s) Oral once    MEDICATIONS  (PRN):  acetaminophen     Tablet .. 650 milliGRAM(s) Oral every 6 hours PRN Temp greater or equal to 38C (100.4F), Mild Pain (1 - 3)  melatonin 3 milliGRAM(s) Oral at bedtime PRN Insomnia  polyethylene glycol 3350 17 Gram(s) Oral daily PRN Constipation      CAPILLARY BLOOD GLUCOSE        I&O's Summary    27 Oct 2021 07:01  -  28 Oct 2021 07:00  --------------------------------------------------------  IN: 1011 mL / OUT: 1050 mL / NET: -39 mL        PHYSICAL EXAM:  Vital Signs Last 24 Hrs  T(C): 36.8 (28 Oct 2021 05:50), Max: 36.8 (27 Oct 2021 17:50)  T(F): 98.2 (28 Oct 2021 05:50), Max: 98.2 (27 Oct 2021 17:50)  HR: 81 (28 Oct 2021 05:50) (66 - 92)  BP: 128/60 (28 Oct 2021 05:50) (100/48 - 128/60)  BP(mean): --  RR: 18 (28 Oct 2021 05:50) (18 - 18)  SpO2: 99% (28 Oct 2021 05:50) (97% - 100%)  CONSTITUTIONAL: thin older woman, NAD  EYES: PERRLA; conjunctiva and sclera clear  ENMT: Moist oral mucosa, no pharyngeal injection or exudates; normal dentition  RESPIRATORY: Normal respiratory effort; lungs are clear to auscultation bilaterally  CARDIOVASCULAR: Regular rate and rhythm, normal S1 and S2, no murmur/rub/gallop; No lower extremity edema; Peripheral pulses are 2+ bilaterally  ABDOMEN: Nontender to palpation, normoactive bowel sounds, no rebound/guarding; No hepatosplenomegaly    LABS:                        11.9   9.69  )-----------( 195      ( 28 Oct 2021 06:49 )             34.5     10-28    127<L>  |  86<L>  |  34<H>  ----------------------------<  101<H>  4.4   |  29  |  0.59    Ca    10.0      28 Oct 2021 06:49  Phos  3.1     10-28  Mg     2.00     10-28      PT/INR - ( 28 Oct 2021 06:49 )   PT: 25.9 sec;   INR: 2.35 ratio                     RADIOLOGY & ADDITIONAL TESTS:  Results Reviewed:   Imaging Personally Reviewed:  Electrocardiogram Personally Reviewed:    COORDINATION OF CARE:  Care Discussed with Consultants/Other Providers [Y/N]:  Prior or Outpatient Records Reviewed [Y/N]:

## 2021-10-28 NOTE — PROGRESS NOTE ADULT - PROBLEM SELECTOR PLAN 3
- Continue Lisinopril and Metoprolol as above.

## 2021-10-28 NOTE — PROGRESS NOTE ADULT - PROBLEM SELECTOR PLAN 7
Worsening right-sided heart pressures on TTE, appreciate Pulmonary evaluation, will continue current regimen
Follow up TTE as above
Worsening right-sided heart pressures on TTE, appreciate Pulmonary evaluation, will continue current regimen
Follow up TTE as above

## 2021-10-28 NOTE — PROGRESS NOTE ADULT - PROBLEM SELECTOR PROBLEM 4
Primary pulmonary hypertension

## 2021-10-28 NOTE — PROGRESS NOTE ADULT - PROBLEM SELECTOR PROBLEM 1
Acute exacerbation of CHF (congestive heart failure)

## 2021-10-28 NOTE — DISCHARGE NOTE NURSING/CASE MANAGEMENT/SOCIAL WORK - NSDCCRNAME_GEN_ALL_CORE_FT
1 - [facility], [address]  2 -  1 - [facility], [address], [place]  2 - [facility] [place]

## 2021-10-28 NOTE — DISCHARGE NOTE NURSING/CASE MANAGEMENT/SOCIAL WORK - MODE OF TRANSPORTATION
Nurse handoff report given to Vanessa. Pt to MRI and then to inpatient room S197.   Belongings (iphone, iwatch and glasses) sent with patient.    [place]

## 2021-10-28 NOTE — PROGRESS NOTE ADULT - NSPROGADDITIONALINFOA_GEN_ALL_CORE
Time planning discharge 35 minutes.
Pending discussion with family, though pt appears to now appreciate that she will not be safe alone at home and may need short-term rehab.

## 2021-10-28 NOTE — DISCHARGE NOTE NURSING/CASE MANAGEMENT/SOCIAL WORK - PATIENT PORTAL LINK FT
You can access the FollowMyHealth Patient Portal offered by [facility] by registering at the following website: [email or web address]. By joining [facility]’s FollowMyHealth portal, you will also be able to view your health information using other applications (apps) compatible with our system.

## 2021-10-28 NOTE — PROGRESS NOTE ADULT - PROBLEM SELECTOR PLAN 5
- Continue Warfarin 2.5 mg on Friday and 5 mg on all other days (Sat->Thurs).  - Daily INR, PLACE Daily Coumadin orders accordingly.  10/23 5mg coumadin ordered ,INR 2.4  10/24 5mg coumadin orered, INR 2.42
- Continue Warfarin 2.5 mg on Friday and 5 mg on all other days (Sat->Thurs).  - Daily INR, PLACE Daily Coumadin orders accordingly.  10/23 5mg coumadin ordered ,INR 2.4
- Continue Warfarin 2.5 mg on Friday and 5 mg on all other days (Sat->Thurs).  - Daily INR, PLACE Daily Coumadin orders accordingly.
- Continue Warfarin 2.5 mg on Friday and 5 mg on all other days (Sat->Thurs).  - Daily INR, PLACE Daily Coumadin orders accordingly.  10/23 5mg coumadin ordered ,INR 2.4  10/24 5mg coumadin orered, INR 2.42
- Continue Warfarin 2.5 mg on Friday and 5 mg on all other days (Sat->Thurs).  - Daily INR, PLACE Daily Coumadin orders accordingly.  10/23 5mg coumadin ordered ,INR 2.4  10/24 5mg coumadin orered, INR 2.42

## 2021-11-01 NOTE — REASON FOR VISIT
[FreeTextEntry1] : 88 yp w persistent AF , on Coumadin , psoriatic arthritis - treated w methotrexate and Enbrel.\par \par No bruising or bleeding with Coumadin  INR checked regularly - she had yesterdays' labs sent to us.\par \par Some increasing leg swelling . No CP or Dyspnea. Diuretic changed.\par \par No PND , orthopnea.\par \par Interval Note\par May 21, 2019\par \par 89 year old female with history of hypertension, persistent atrial fibrillation on Coumadin and psoriatic arthritis treated with Enbrel and Methotrexate.\par She ambulates with a walker secondary to chronic right leg tibial fracture.\par \par Patient presents today with chronic, but stable swelling in her lower extremities and elevated blood pressure. \par Blood pressure today is 168/75, however, patient has not taken her diuretic today for concern that she would have an episode of incontinence.\par \par Patient does report that even at home she does not take the full 40 mg of Lasix due to excessive urgency and incontinence. \par Currently taking only Lasix 20mg daily. \par \par Of note, patient is in need of a dental extraction and will require a medical clearance for oral surgeon\par Dr. Juan Francisco Stauffer in [place] on May 31, 2019.\par \par Additionally, patient underwent an echocardiogram today and will be reviewed prior to her dental procedure. \par

## 2021-11-02 ENCOUNTER — APPOINTMENT (OUTPATIENT)
Dept: CARDIOLOGY | Facility: CLINIC | Age: 86
End: 2021-11-02

## 2021-11-17 ENCOUNTER — APPOINTMENT (OUTPATIENT)
Dept: CARDIOLOGY | Facility: CLINIC | Age: 86
End: 2021-11-17
Payer: MEDICARE

## 2021-11-17 VITALS
SYSTOLIC BLOOD PRESSURE: 128 MMHG | HEART RATE: 72 BPM | OXYGEN SATURATION: 96 % | HEIGHT: 64 IN | DIASTOLIC BLOOD PRESSURE: 63 MMHG

## 2021-11-17 DIAGNOSIS — I27.20 PULMONARY HYPERTENSION, UNSPECIFIED: ICD-10-CM

## 2021-11-17 DIAGNOSIS — I10 ESSENTIAL (PRIMARY) HYPERTENSION: ICD-10-CM

## 2021-11-17 DIAGNOSIS — I48.91 UNSPECIFIED ATRIAL FIBRILLATION: ICD-10-CM

## 2021-11-17 PROCEDURE — 99215 OFFICE O/P EST HI 40 MIN: CPT | Mod: 95

## 2021-11-17 NOTE — HISTORY OF PRESENT ILLNESS
[Other Location: e.g. School (Enter Location, City,State)___] : at [unfilled], at the time of the visit. [Other Location: e.g. Home (Enter Location, City,State)___] : at [unfilled] [Family Member] : family member [Verbal consent obtained from patient] : the patient, [unfilled]

## 2021-11-18 NOTE — REASON FOR VISIT
[FreeTextEntry1] : [facility] : [phone] \par  fax ) [phone]\par \par Patient seen via Telehealth accompanied by her daughter Ms. Anisa Spann as patient is admitted to [facility] acute rehab in [place] after recent hospitalization at [facility] on 10/21/21 for acute decompensated HF and discharged to acute rehab on 10/28/21. \par Ms. Nneka Cuevas is a 89 yr old with past medical history of persistent AF on Coumadin, psoriatic arthritis - on methotrexate and Enbrel, femur fracture, pulmonary HTN ( based on elevated PA pressures ~ 54  and worsening RV function on TTE). She was evaluated by Dr. Sary Renteria in 2019 and found not a candidate for any pulmonary vasodilator treatment. \par Today, 11/17/21- Ms. Cuevas appears to be in good spirits, currently on Oxygen 2L via NC. Reports she is very thirsty and her mouth is dry. Despite Lasix 40 mg daily, she has been using 3 pillows to sleep ( a change since recent hospitalization ). Recent metabolic profile from 11/8/21 indicate BUN /Cr : 36.3 /0.49. According to her daughter, patient's BL LE is NOT swollen. And walking to the  bathroom takes a lot of effort as she is SOB. ON RA her Sats are 89- 90% and with 2L she sats up to 96% . Currently, she denies chest pain, LH, dizziness but endorses to intermittent palpitations. Review of EKG from recent hospitalization showed AF ~ 80's. \par \par # Persistent AF - \par Continue Metoprolol XL 25 mg QD for rate control\par 11/15 : INR 1.8, Continue Coumadin and maintain INR 2-3 \par currently on Coumadin 5 mg \par INR check pending on 11/19/21\par \par # Congestive HF : Stable \par Continue Lasix 40 mg QD \par Recent CXR from 11/11/21 showed no infiltrates but increased pulmonary marking Right lung base. \par \par # HTN : BP stable today 128/63 \par Continue Lisinopril 2.5 mg QD \par \par # Pulmonary HTN : \par Continue Oxygen 2 L /min and maintain sats > 92 %\par Recommend follow up with Dr. Renteria \par \par Time spent - 50 mins via TEB\par \par  \par

## 2021-11-18 NOTE — CARDIOLOGY SUMMARY
[___] : [unfilled] [___] : [unfilled] [de-identified] : CONCLUSIONS:\par 1. Mild aortic root dilatation  (Ao:4 cm).\par 2. Severely dilated left atrium.  LA volume index = 68\par cc/m2.\par 3. Normal left ventricular systolic function. Prominent\par upper septal hypertrophy without obstruction. \par 4. Right ventricular enlargement with decreased right\par ventricular systolic function.\par 5. Normal tricuspid valve.   Moderate tricuspid\par regurgitation.\par 6. Estimated pulmonary artery systolic pressure equals 54\par mm Hg, assuming right atrial pressure equals 10  mm Hg,\par consistent with moderate pulmonary hypertension.\par *** Compared with echocardiogram of 5/21/2019, RV function\par has decreased and the RV is larger\par ------------------------------------------------------------------------\par Confirmed on  10/24/2021 - 15:42:46 by Steph Gaspar MD\par ------------------------------------------------------------------------\par \par \par 2019: CONCLUSIONS:\par 1. Mitral annular calcification, otherwise normal mitral\par valve. Mild-moderate mitral regurgitation.\par 2. Calcified trileaflet aortic valve with normal opening.\par Mild-moderate aortic regurgitation.\par 3. Severely dilated left atrium.  LA volume index = 54\par cc/m2.\par 4. Normal left ventricular internal dimensions and wall\par thicknesses.\par 5. Normal left ventricular systolic function. No segmental\par wall motion abnormalities.\par 6. Severe right atrial enlargement.\par 7. Normal right ventricular size and function.\par 8. Estimated right ventricular systolic pressure equals 61\par mm Hg, assuming right atrial pressure equals 10 mm Hg,\par consistent with severe pulmonary hypertension.\par *** Compared with echocardiogram of 8/21/2017, no\par significant changes noted.\par ------------------------------------------------------------------------\carlos Confirmed on  5/21/2019 - 10:42:09 by Evgeny Camarillo M.D.\par ------------------------------------------------------------------------

## 2021-11-18 NOTE — DISCUSSION/SUMMARY
[FreeTextEntry1] : Patient seen via Telehealth accompanied by her daughter Ms. Anisa Spann as patient is admitted to [facility] acute rehab in [place] after recent hospitalization at [facility] on 10/21/21 for acute decompensated HF and discharged to acute rehab on 10/28/21. \par Ms. Nneka Cuevas is a 89 yr old with past medical history of persistent AF on Coumadin, psoriatic arthritis - on methotrexate and Enbrel, femur fracture, pulmonary HTN ( based on elevated PA pressures ~ 54  and worsening RV function on TTE). She was evaluated by Dr. Sary Renteria in 2019 and found not a candidate for any pulmonary vasodilator treatment. \par Today, 11/17/21- Ms. Cuevas appears to be in good spirits, currently on Oxygen 2L via NC. Reports she is very thirsty and her mouth is dry. Despite Lasix 40 mg daily, she has been using 3 pillows to sleep ( a change since recent hospitalization ). Recent metabolic profile from 11/8/21 indicate BUN /Cr : 36.3 /0.49. According to her daughter, patient's BL LE is NOT swollen. And walking to the  bathroom takes a lot of effort as she is SOB. ON RA her Sats are 89- 90% and with 2L she sats up to 96% . Currently, she denies chest pain, LH, dizziness but endorses to intermittent palpitations. Review of EKG from recent hospitalization showed AF ~ 80's. \par \par # Persistent AF - \par Continue Metoprolol XL 25 mg QD for rate control\par 11/15 : INR 1.8, Continue Coumadin and maintain INR 2-3 \par currently on Coumadin 5 mg \par INR check pending on 11/19/21\par \par # Congestive HF : Stable \par Continue Lasix 40 mg QD \par Recent CXR from 11/11/21 showed no infiltrates but increased pulmonary marking Right lung base. \par \par # HTN : BP stable today 128/63 \par Continue Lisinopril 2.5 mg QD \par \par # Pulmonary HTN : \par Continue Oxygen 2 L /min and maintain sats > 92 %\par Recommend follow up with Dr. Renteria \par \par Time spent - 50 mins via TEB\par \par  \par

## 2021-11-18 NOTE — REVIEW OF SYSTEMS
[SOB] : shortness of breath [Dyspnea on exertion] : dyspnea during exertion [Negative] : Heme/Lymph [FreeTextEntry6] : SOB with excretion

## 2021-12-07 ENCOUNTER — NON-APPOINTMENT (OUTPATIENT)
Age: 86
End: 2021-12-07

## 2021-12-08 ENCOUNTER — NON-APPOINTMENT (OUTPATIENT)
Age: 86
End: 2021-12-08

## 2022-11-05 NOTE — ED ADULT NURSE NOTE - TEMPLATE
"Her Mom has called the police on them. She is wanting a 72 psych evaluation. She is threating the family with harm. \" She is saying she will knock the head off.\" She called 2 hours ago to the Police they have not arrived.  She is calling 911.    " General

## 2025-07-08 NOTE — PROGRESS NOTE ADULT - TIME-BASED BILLING (NON-CRITICAL CARE)
----- Message from Dr. Acosta De La Garza MD sent at 7/2/2025  3:02 PM EDT -----  Please let the patient know his iron studies are within normal limits.  
Call pt,no answer. I left message on machine to call back.   
I spoke with the pt and gave lab results, pt voiced understanding   
Pt wife called back   
Time-based billing (NON-critical care)